# Patient Record
Sex: FEMALE | Race: WHITE | NOT HISPANIC OR LATINO | Employment: FULL TIME | URBAN - METROPOLITAN AREA
[De-identification: names, ages, dates, MRNs, and addresses within clinical notes are randomized per-mention and may not be internally consistent; named-entity substitution may affect disease eponyms.]

---

## 2019-07-19 ENCOUNTER — APPOINTMENT (OUTPATIENT)
Dept: WOMENS IMAGING | Facility: HOSPITAL | Age: 49
End: 2019-07-19

## 2019-07-19 PROCEDURE — 77067 SCR MAMMO BI INCL CAD: CPT | Performed by: RADIOLOGY

## 2019-07-19 PROCEDURE — 77063 BREAST TOMOSYNTHESIS BI: CPT | Performed by: RADIOLOGY

## 2020-07-16 ENCOUNTER — PREP FOR SURGERY (OUTPATIENT)
Dept: OTHER | Facility: HOSPITAL | Age: 50
End: 2020-07-16

## 2020-07-16 DIAGNOSIS — Z12.11 SCREENING FOR COLON CANCER: Primary | ICD-10-CM

## 2020-07-17 PROBLEM — Z12.11 SCREENING FOR COLON CANCER: Status: ACTIVE | Noted: 2020-07-17

## 2020-10-01 PROBLEM — K90.0 CELIAC DISEASE: Status: ACTIVE | Noted: 2019-12-10

## 2020-10-01 RX ORDER — OMEGA-3 FATTY ACIDS/FISH OIL 300-1000MG
600 CAPSULE ORAL
COMMUNITY
End: 2022-01-06

## 2020-11-20 ENCOUNTER — OFFICE VISIT (OUTPATIENT)
Dept: SURGERY | Facility: CLINIC | Age: 50
End: 2020-11-20

## 2020-11-20 VITALS
TEMPERATURE: 97.7 F | SYSTOLIC BLOOD PRESSURE: 120 MMHG | WEIGHT: 157.3 LBS | HEIGHT: 65 IN | OXYGEN SATURATION: 99 % | DIASTOLIC BLOOD PRESSURE: 90 MMHG | BODY MASS INDEX: 26.21 KG/M2 | HEART RATE: 76 BPM

## 2020-11-20 DIAGNOSIS — Z12.12 SCREENING FOR COLORECTAL CANCER: ICD-10-CM

## 2020-11-20 DIAGNOSIS — Z12.11 SCREENING FOR COLORECTAL CANCER: ICD-10-CM

## 2020-11-20 DIAGNOSIS — K64.4 ANAL SKIN TAG: Primary | ICD-10-CM

## 2020-11-20 PROCEDURE — 99203 OFFICE O/P NEW LOW 30 MIN: CPT | Performed by: COLON & RECTAL SURGERY

## 2020-11-20 RX ORDER — TOBRAMYCIN AND DEXAMETHASONE 3; 1 MG/ML; MG/ML
SUSPENSION/ DROPS OPHTHALMIC
COMMUNITY
Start: 2020-08-26 | End: 2020-11-20

## 2020-11-20 RX ORDER — SODIUM CHLORIDE, SODIUM LACTATE, POTASSIUM CHLORIDE, CALCIUM CHLORIDE 600; 310; 30; 20 MG/100ML; MG/100ML; MG/100ML; MG/100ML
30 INJECTION, SOLUTION INTRAVENOUS CONTINUOUS
Status: CANCELLED | OUTPATIENT
Start: 2021-02-03

## 2020-11-20 NOTE — PROGRESS NOTES
Noemi Chang is a 50 y.o. female who is seen in consultation for abnormal anal exam.      HPI:  Needs Cy for age  It was suggested that this tag could be taken off at the same time  Wanted to do Cologuard but primary said no because of unknown reason  Issue with constipation in past  Unknown family history b/c adopted  Splint for bm 50%     Tag - no bothering  After last delivery ?fissure or tear  Gyn with annual exams-have called it hem or tag    Patient is a Mohs surgery tech    abnl pap once had scrapping?   neg hpv      Past Medical History:   Diagnosis Date   • Celiac disease    • CKD (chronic kidney disease)     STAGE 1   • Kidney stones     FOLLOWED BY DR. STEPHEN BEAR   • SAB (spontaneous )      A2   • Tear of medial meniscus of left knee        Past Surgical History:   Procedure Laterality Date   •  SECTION N/A    •  SECTION N/A    • CHOLECYSTECTOMY N/A    • COLONOSCOPY N/A     NO RECORDS, PER PT WNL, (+) CELIAC DISEASE, DR. WOOD AT [place]   • ENDOSCOPY N/A    • EXTRACORPOREAL SHOCK WAVE LITHOTRIPSY (ESWL) N/A 2018    DR. STEPHEN BEAR AT [place]   • KNEE ARTHROSCOPY W/ MENISCAL REPAIR Left    • SINUS SURGERY N/A    • TONSILLECTOMY AND ADENOIDECTOMY Bilateral        Social History:   reports that she has never smoked. She has never used smokeless tobacco. She reports current alcohol use. She reports that she does not use drugs.      Marriage status:     Family History   Family history unknown: Yes         Current Outpatient Medications:   •  Ibuprofen 200 MG capsule, Take 600 mg by mouth., Disp: , Rfl:   •  progesterone (PROMETRIUM) 200 MG capsule, , Disp: , Rfl:     Allergy  Patient has no known allergies.    Review of Systems   Constitution: Negative for decreased appetite and weight gain.   HENT: Negative for congestion, hearing loss and hoarse voice.    Eyes: Negative for blurred vision, discharge and visual  disturbance.   Cardiovascular: Negative for chest pain, cyanosis and leg swelling.   Respiratory: Negative for cough, shortness of breath, sleep disturbances due to breathing and snoring.    Endocrine: Negative for cold intolerance and heat intolerance.   Hematologic/Lymphatic: Does not bruise/bleed easily.   Skin: Negative for itching, poor wound healing and skin cancer.   Musculoskeletal: Positive for joint pain. Negative for arthritis, back pain and joint swelling.   Gastrointestinal: Negative for abdominal pain, change in bowel habit, bowel incontinence and constipation.   Genitourinary: Negative for bladder incontinence, dysuria and hematuria.   Neurological: Negative for brief paralysis, excessive daytime sleepiness, dizziness, focal weakness, headaches, light-headedness and weakness.   Psychiatric/Behavioral: Negative for altered mental status and hallucinations. The patient does not have insomnia.    Allergic/Immunologic: Negative for HIV exposure and persistent infections.   All other systems reviewed and are negative.      Vitals:    11/20/20 0842   BP: 120/90   Pulse: 76   Temp: 97.7 °F (36.5 °C)   SpO2: 99%     Body mass index is 26.18 kg/m².    Physical Exam  Exam conducted with a chaperone present.   Constitutional:       General: She is not in acute distress.     Appearance: She is well-developed.   HENT:      Head: Normocephalic and atraumatic.      Nose: Nose normal.   Eyes:      Conjunctiva/sclera: Conjunctivae normal.      Pupils: Pupils are equal, round, and reactive to light.   Neck:      Musculoskeletal: Normal range of motion.      Trachea: No tracheal deviation.   Pulmonary:      Effort: Pulmonary effort is normal. No respiratory distress.      Breath sounds: Normal breath sounds.   Abdominal:      General: Bowel sounds are normal. There is no distension.      Palpations: Abdomen is soft.   Genitourinary:     Comments: Perianal exam: external hem - wnl anterior tag  YARON-good tone, no  masses    Musculoskeletal: Normal range of motion.         General: No deformity.   Skin:     General: Skin is warm and dry.   Neurological:      Mental Status: She is alert and oriented to person, place, and time.      Cranial Nerves: No cranial nerve deficit.      Coordination: Coordination normal.      Gait: Gait normal.   Psychiatric:         Behavior: Behavior normal.         Judgment: Judgment normal.         Assessment:  1. Anal skin tag    2. Screening for colorectal cancer        Plan:  Would not recommend tag removal.  Discussed with patient that it is painful and will lead to scarring which can be just as big as the tag is currently  For colon cancer screening I recommend doing colonoscopy.  We discussed risk benefits and alternatives.  Patient is amenable and will get well today

## 2020-11-20 NOTE — PROGRESS NOTES
"Cy for age  Been told have Dr. Dunaway should take  cologuard ?  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   neg hpv    bm daily  Now \"plant based diet\"  Did 30 day juice fast- 80% veg    Perianal exam: ant and post tag  YARON- good tone, no masses      "

## 2021-01-17 ENCOUNTER — IMMUNIZATION (OUTPATIENT)
Dept: VACCINE CLINIC | Facility: HOSPITAL | Age: 51
End: 2021-01-17

## 2021-01-17 PROCEDURE — 0001A: CPT | Performed by: INTERNAL MEDICINE

## 2021-01-17 PROCEDURE — 91300 HC SARSCOV02 VAC 30MCG/0.3ML IM: CPT | Performed by: INTERNAL MEDICINE

## 2021-02-12 ENCOUNTER — IMMUNIZATION (OUTPATIENT)
Dept: VACCINE CLINIC | Facility: HOSPITAL | Age: 51
End: 2021-02-12

## 2021-02-12 PROCEDURE — 0002A: CPT | Performed by: INTERNAL MEDICINE

## 2021-02-12 PROCEDURE — 91300 HC SARSCOV02 VAC 30MCG/0.3ML IM: CPT | Performed by: INTERNAL MEDICINE

## 2021-07-23 ENCOUNTER — APPOINTMENT (OUTPATIENT)
Dept: WOMENS IMAGING | Facility: HOSPITAL | Age: 51
End: 2021-07-23

## 2021-07-23 PROCEDURE — 77067 SCR MAMMO BI INCL CAD: CPT | Performed by: RADIOLOGY

## 2021-07-23 PROCEDURE — 77063 BREAST TOMOSYNTHESIS BI: CPT | Performed by: RADIOLOGY

## 2021-09-09 ENCOUNTER — LAB (OUTPATIENT)
Dept: LAB | Facility: HOSPITAL | Age: 51
End: 2021-09-09

## 2021-09-09 ENCOUNTER — TRANSCRIBE ORDERS (OUTPATIENT)
Dept: LAB | Facility: HOSPITAL | Age: 51
End: 2021-09-09

## 2021-09-09 DIAGNOSIS — Z00.00 ROUTINE GENERAL MEDICAL EXAMINATION AT A HEALTH CARE FACILITY: Primary | ICD-10-CM

## 2021-09-09 DIAGNOSIS — Z00.00 ROUTINE GENERAL MEDICAL EXAMINATION AT A HEALTH CARE FACILITY: ICD-10-CM

## 2021-09-09 PROCEDURE — U0004 COV-19 TEST NON-CDC HGH THRU: HCPCS

## 2021-09-09 PROCEDURE — C9803 HOPD COVID-19 SPEC COLLECT: HCPCS

## 2021-09-10 LAB — SARS-COV-2 RNA NOSE QL NAA+PROBE: NOT DETECTED

## 2022-01-03 ENCOUNTER — TRANSCRIBE ORDERS (OUTPATIENT)
Dept: ADMINISTRATIVE | Facility: HOSPITAL | Age: 52
End: 2022-01-03

## 2022-01-03 DIAGNOSIS — R10.11 ABDOMINAL PAIN, RIGHT UPPER QUADRANT: Primary | ICD-10-CM

## 2022-01-04 ENCOUNTER — HOSPITAL ENCOUNTER (OUTPATIENT)
Dept: CT IMAGING | Facility: HOSPITAL | Age: 52
Discharge: HOME | End: 2022-01-04
Admitting: FAMILY MEDICINE

## 2022-01-04 DIAGNOSIS — R10.11 ABDOMINAL PAIN, RIGHT UPPER QUADRANT: ICD-10-CM

## 2022-01-04 LAB — CREAT BLDA-MCNC: 0.7 MG/DL

## 2022-01-04 PROCEDURE — 0 IOPAMIDOL PER 1 ML: Performed by: FAMILY MEDICINE

## 2022-01-04 PROCEDURE — 82565 ASSAY OF CREATININE: CPT

## 2022-01-04 PROCEDURE — 74160 CT ABDOMEN W/CONTRAST: CPT

## 2022-01-04 RX ADMIN — IOPAMIDOL 100 ML: 755 INJECTION, SOLUTION INTRAVENOUS at 09:53

## 2022-01-06 ENCOUNTER — APPOINTMENT (OUTPATIENT)
Dept: GENERAL RADIOLOGY | Facility: HOSPITAL | Age: 52
End: 2022-01-06

## 2022-01-06 ENCOUNTER — OFFICE VISIT (OUTPATIENT)
Dept: GASTROENTEROLOGY | Facility: CLINIC | Age: 52
End: 2022-01-06

## 2022-01-06 ENCOUNTER — HOSPITAL ENCOUNTER (EMERGENCY)
Facility: HOSPITAL | Age: 52
Discharge: HOME | End: 2022-01-06
Attending: EMERGENCY MEDICINE | Admitting: EMERGENCY MEDICINE

## 2022-01-06 VITALS — DIASTOLIC BLOOD PRESSURE: 72 MMHG | BODY MASS INDEX: 26.18 KG/M2 | HEIGHT: 65 IN | SYSTOLIC BLOOD PRESSURE: 130 MMHG

## 2022-01-06 VITALS
HEIGHT: 65 IN | RESPIRATION RATE: 16 BRPM | HEART RATE: 84 BPM | WEIGHT: 181.22 LBS | DIASTOLIC BLOOD PRESSURE: 85 MMHG | BODY MASS INDEX: 30.19 KG/M2 | OXYGEN SATURATION: 98 % | SYSTOLIC BLOOD PRESSURE: 153 MMHG | TEMPERATURE: 98 F

## 2022-01-06 DIAGNOSIS — M25.422 EFFUSION OF LEFT ELBOW: ICD-10-CM

## 2022-01-06 DIAGNOSIS — K90.0 CELIAC DISEASE: Chronic | ICD-10-CM

## 2022-01-06 DIAGNOSIS — S52.125A CLOSED NONDISPLACED FRACTURE OF HEAD OF LEFT RADIUS, INITIAL ENCOUNTER: Primary | ICD-10-CM

## 2022-01-06 DIAGNOSIS — R93.2 ABNORMAL CT OF LIVER: ICD-10-CM

## 2022-01-06 DIAGNOSIS — K76.0 HEPATIC STEATOSIS: ICD-10-CM

## 2022-01-06 DIAGNOSIS — Z12.11 COLON CANCER SCREENING: ICD-10-CM

## 2022-01-06 DIAGNOSIS — R10.11 RUQ PAIN: Primary | ICD-10-CM

## 2022-01-06 PROCEDURE — 73080 X-RAY EXAM OF ELBOW: CPT

## 2022-01-06 PROCEDURE — 63710000001 ONDANSETRON ODT 4 MG TABLET DISPERSIBLE: Performed by: EMERGENCY MEDICINE

## 2022-01-06 PROCEDURE — 99204 OFFICE O/P NEW MOD 45 MIN: CPT | Performed by: INTERNAL MEDICINE

## 2022-01-06 PROCEDURE — 99284 EMERGENCY DEPT VISIT MOD MDM: CPT

## 2022-01-06 RX ORDER — SODIUM CHLORIDE, SODIUM LACTATE, POTASSIUM CHLORIDE, CALCIUM CHLORIDE 600; 310; 30; 20 MG/100ML; MG/100ML; MG/100ML; MG/100ML
30 INJECTION, SOLUTION INTRAVENOUS CONTINUOUS
Status: CANCELLED | OUTPATIENT
Start: 2022-02-11

## 2022-01-06 RX ORDER — SERTRALINE HYDROCHLORIDE 100 MG/1
100 TABLET, FILM COATED ORAL NIGHTLY
COMMUNITY
Start: 2021-12-28 | End: 2023-03-13

## 2022-01-06 RX ORDER — HYDROCODONE BITARTRATE AND ACETAMINOPHEN 5; 325 MG/1; MG/1
1 TABLET ORAL EVERY 6 HOURS PRN
Qty: 12 TABLET | Refills: 0 | OUTPATIENT
Start: 2022-01-06 | End: 2022-06-29

## 2022-01-06 RX ORDER — HYDROXYZINE PAMOATE 50 MG/1
CAPSULE ORAL
COMMUNITY
Start: 2021-12-15 | End: 2023-03-13

## 2022-01-06 RX ORDER — ONDANSETRON 4 MG/1
4 TABLET, ORALLY DISINTEGRATING ORAL EVERY 6 HOURS PRN
Qty: 20 TABLET | Refills: 0 | OUTPATIENT
Start: 2022-01-06 | End: 2022-06-29

## 2022-01-06 RX ORDER — ONDANSETRON 4 MG/1
4 TABLET, ORALLY DISINTEGRATING ORAL ONCE
Status: COMPLETED | OUTPATIENT
Start: 2022-01-06 | End: 2022-01-06

## 2022-01-06 RX ORDER — HYDROCODONE BITARTRATE AND ACETAMINOPHEN 5; 325 MG/1; MG/1
1 TABLET ORAL ONCE
Status: COMPLETED | OUTPATIENT
Start: 2022-01-06 | End: 2022-01-06

## 2022-01-06 RX ADMIN — ONDANSETRON 4 MG: 4 TABLET, ORALLY DISINTEGRATING ORAL at 18:12

## 2022-01-06 RX ADMIN — HYDROCODONE BITARTRATE AND ACETAMINOPHEN 1 TABLET: 5; 325 TABLET ORAL at 18:12

## 2022-01-06 NOTE — ED PROVIDER NOTES
Subjective   Pt slipped and fell on ice while getting out of car. Struck elbow on the car as she fell, now complaining of severe pain in elbow with movement.      History provided by:  Patient   used: No        Review of Systems   Constitutional: Negative.    HENT: Negative.    Eyes: Negative.    Respiratory: Negative.    Cardiovascular: Negative.    Gastrointestinal: Negative.    Endocrine: Negative.    Genitourinary: Negative.    Musculoskeletal: Negative.    Skin: Negative.    Allergic/Immunologic: Negative.    Neurological: Negative.    Hematological: Negative.    Psychiatric/Behavioral: Negative.        Past Medical History:   Diagnosis Date   • Celiac disease    • CKD (chronic kidney disease)     STAGE 1   • Kidney stones     FOLLOWED BY DR. STEPHEN BEAR   • SAB (spontaneous )      A2   • Tear of medial meniscus of left knee        No Known Allergies    Past Surgical History:   Procedure Laterality Date   •  SECTION N/A    •  SECTION N/A    • CHOLECYSTECTOMY N/A    • COLONOSCOPY N/A     NO RECORDS, PER PT WNL, (+) CELIAC DISEASE, DR. WOOD AT [place]   • ENDOSCOPY N/A    • EXTRACORPOREAL SHOCK WAVE LITHOTRIPSY (ESWL) N/A 2018    DR. STEPHEN BEAR AT [place]   • KNEE ARTHROSCOPY W/ MENISCAL REPAIR Left    • SINUS SURGERY N/A    • TONSILLECTOMY AND ADENOIDECTOMY Bilateral        Family History   Family history unknown: Yes       Social History     Socioeconomic History   • Marital status:    Tobacco Use   • Smoking status: Never Smoker   • Smokeless tobacco: Never Used   Substance and Sexual Activity   • Alcohol use: Yes     Comment: RARELY   • Drug use: Never   • Sexual activity: Not Currently     Comment: Single.           Objective   Physical Exam  Vitals and nursing note reviewed.   Constitutional:       Appearance: Normal appearance.   HENT:      Head: Normocephalic and atraumatic.      Nose: Nose normal.       Mouth/Throat:      Mouth: Mucous membranes are moist.   Eyes:      Pupils: Pupils are equal, round, and reactive to light.   Cardiovascular:      Rate and Rhythm: Normal rate and regular rhythm.      Pulses: Normal pulses.   Pulmonary:      Effort: Pulmonary effort is normal.      Breath sounds: Normal breath sounds.   Abdominal:      General: Abdomen is flat. Bowel sounds are normal.      Palpations: Abdomen is soft.   Musculoskeletal:      Right elbow: Normal.      Left elbow: Decreased range of motion. Tenderness present in radial head.        Arms:       Cervical back: Normal range of motion.      Comments: Sensation intact, distal pulses present, CRT <2sec   Skin:     General: Skin is warm and dry.      Capillary Refill: Capillary refill takes less than 2 seconds.   Neurological:      General: No focal deficit present.      Mental Status: She is alert and oriented to person, place, and time. Mental status is at baseline.   Psychiatric:         Mood and Affect: Mood normal.         FX Dislocation    Date/Time: 1/6/2022 6:37 PM  Performed by: Karuna Bah APRN  Authorized by: Jamie Hess MD     Consent:     Consent obtained:  Verbal    Consent given by:  Patient    Risks discussed:  Pain  Injury:     Injury location:  Elbow    Elbow injury location:  L elbow  Pre-procedure assessment:     Neurological function: normal      Distal perfusion: normal      Range of motion: reduced    Anesthesia (see MAR for exact dosages):     Anesthesia method:  None  Procedure details:     Manipulation performed: no      Skin traction used: no      Skeletal traction used: no      Immobilization:  Splint    Splint type:  Long arm    Supplies used:  Ortho-Glass, elastic bandage and cotton padding  Post-procedure details:     Neurological function: normal      Distal perfusion: normal      Range of motion: unchanged      Patient tolerance of procedure:  Tolerated well, no immediate complications      MDM  Number of Diagnoses  or Management Options  Closed nondisplaced fracture of head of left radius, initial encounter: new and requires workup  Effusion of left elbow: new and requires workup     Amount and/or Complexity of Data Reviewed  Tests in the radiology section of CPT®: reviewed and ordered    Risk of Complications, Morbidity, and/or Mortality  Presenting problems: low  Diagnostic procedures: low  Management options: low    Patient Progress  Patient progress: stable      Final diagnoses:   Closed nondisplaced fracture of head of left radius, initial encounter   Effusion of left elbow       ED Disposition  ED Disposition     ED Disposition Condition Comment    Discharge Stable           Been, Richmond SANTOS MD  [address] [place] [address]  [place] [address] [phone]    Call   for a follow up appointment in the next week or two         Medication List      New Prescriptions    HYDROcodone-acetaminophen 5-325 MG per tablet  Commonly known as: NORCO  Take 1 tablet by mouth Every 6 (Six) Hours As Needed for Moderate Pain  or Severe Pain .     ondansetron ODT 4 MG disintegrating tablet  Commonly known as: ZOFRAN-ODT  Place 1 tablet on the tongue Every 6 (Six) Hours As Needed for Nausea or Vomiting.           Where to Get Your Medications      These medications were sent to [id number] - [place], [place] - [address] AT [facility] - [phone]  - [phone] FX  [address] [place] [address]    Phone: [phone]   · HYDROcodone-acetaminophen 5-325 MG per tablet  · ondansetron ODT 4 MG disintegrating tablet          Karuna Bah, APRN  01/06/22 7961

## 2022-01-06 NOTE — DISCHARGE INSTRUCTIONS
Elevate, apply ice for 20mins on and 20mins off for the next several days. Keep the splint clean and dry, wear the sling while up and about but at home come out of the sling and put your arm up on a pillow as much as possible. Take the norco as needed for pain and the zofran as needed for nausea. Follow up with Dr Burciaga in the next week or two, return to the ED for worsening or new symptoms of concern.

## 2022-01-06 NOTE — PROGRESS NOTES
"Chief Complaint   Patient presents with   • Abdominal Pain   • Hepatic Disease       Subjective     HPI    Noemi Chang is a 51 y.o. female with a past medical history noted below who presents for abdominal pain and abnormal liver imaging.    She saw urgent care earlier this week for episodes of RUQ pain. Feels full and bloated, feels that something \"catches\" when she leans forward.  Wraps around her back.  Had a CT scan and was told to follow up with GI, evidence of steatosis, altered enhancement of the hepatic lobe for which MRI was recommended.    She has had about 20# weight gain over the past year.    Reports hx of celiac disease diagnosed 2007.  She has been on a gluten free diet.  No recent celiac serologies.  Last gastroenterologist was in [place].  She does not believe she is ever had celiac serologies tested.  She has not had any recent labs.    Family hx unknown, she is adopted.    Hx of shonda, C -section    No smoking, no excess ETOH.      Overdue for colonoscopy for screening.    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    Today's visit was in the office.  Both the patient and I were wearing face masks and proper hand hygiene was performed before and after the physical exam.           Current Outpatient Medications:   •  hydrOXYzine pamoate (VISTARIL) 50 MG capsule, TAKE 1 CAPSULE BY MOUTH TWICE DAILY AS NEEDED FOR PANIC OR SLEEP, Disp: , Rfl:   •  progesterone (PROMETRIUM) 200 MG capsule, , Disp: , Rfl:   •  sertraline (ZOLOFT) 100 MG tablet, TAKE 1 TABLET BY MOUTH EACH DAY, Disp: , Rfl:       Objective     Vitals:    01/06/22 0948   BP: 130/72     There were no vitals filed for this visit.  Body mass index is 26.18 kg/m².    Physical Exam  Constitutional:       General: She is not in acute distress.  Pulmonary:      Effort: Pulmonary effort is normal.   Neurological:      Mental Status: She is alert and oriented to person, place, and time.   Psychiatric:         Mood and Affect: Mood " normal.         Behavior: Behavior normal.         Thought Content: Thought content normal.         Judgment: Judgment normal.             WBC   Date Value Ref Range Status   01/25/2019 4.72 4.5 - 11.0 10*3/uL Final     RBC   Date Value Ref Range Status   01/25/2019 4.26 4.0 - 5.2 10*6/uL Final     Hemoglobin   Date Value Ref Range Status   01/25/2019 13.3 12.0 - 16.0 g/dL Final     Hematocrit   Date Value Ref Range Status   01/25/2019 38.1 36.0 - 46.0 % Final     MCV   Date Value Ref Range Status   01/25/2019 89.4 80.0 - 100.0 fL Final     MCH   Date Value Ref Range Status   01/25/2019 31.2 26.0 - 34.0 pg Final     MCHC   Date Value Ref Range Status   01/25/2019 34.9 31.0 - 37.0 g/dL Final     RDW   Date Value Ref Range Status   01/25/2019 12.0 12.0 - 16.8 % Final     MPV   Date Value Ref Range Status   01/25/2019 10.1 6.7 - 10.8 fL Final     Platelets   Date Value Ref Range Status   01/25/2019 209 140 - 440 10*3/uL Final     Neutrophil Rel %   Date Value Ref Range Status   01/25/2019 49.2 45 - 80 % Final     Lymphocyte Rel %   Date Value Ref Range Status   01/25/2019 37.7 15 - 50 % Final     Monocyte Rel %   Date Value Ref Range Status   01/25/2019 8.9 0 - 15 % Final     Eosinophil %   Date Value Ref Range Status   01/25/2019 3.8 0 - 7 % Final     Basophil Rel %   Date Value Ref Range Status   01/25/2019 0.2 0 - 2 % Final     Immature Grans %   Date Value Ref Range Status   01/25/2019 0.2 (H) 0 % Final     Neutrophils Absolute   Date Value Ref Range Status   01/25/2019 2.32 2.0 - 8.8 10*3/uL Final     Lymphocytes Absolute   Date Value Ref Range Status   01/25/2019 1.78 0.7 - 5.5 10*3/uL Final     Monocytes Absolute   Date Value Ref Range Status   01/25/2019 0.42 0.0 - 1.7 10*3/uL Final     Eosinophils Absolute   Date Value Ref Range Status   01/25/2019 0.18 0.0 - 0.8 10*3/uL Final     Basophils Absolute   Date Value Ref Range Status   01/25/2019 0.01 0.0 - 0.2 10*3/uL Final     Immature Grans, Absolute   Date  Value Ref Range Status   01/25/2019 0.01 <1 10*3/uL Final     nRBC   Date Value Ref Range Status   01/25/2019 0 0 /100(WBC) Final       Lab Results   Component Value Date    BUN 11 01/09/2020    CREATININE 0.70 01/04/2022    BCR 19.0 01/09/2020    CO2 26 01/09/2020    CALCIUM 9.4 01/09/2020    ALBUMIN 4.3 01/09/2020    LABIL2 1.7 01/09/2020    AST 19 01/09/2020    ALT 21 01/09/2020       PROCEDURE:  CT ABDOMEN W CONTRAST     COMPARISON: None     INDICATIONS:  RUQ PAIN POST CHOLSYSTECTOMY. nausea     TECHNIQUE:    After obtaining the patient's consent, CT images were created with non-ionic intravenous   contrast material.       PROTOCOL:     Standard imaging protocol performed                 RADIATION:      DLP: 362mGy*cm               Automated exposure control was utilized to minimize radiation dose.   CONTRAST:      100cc Isovue 370 I.V.     FINDINGS:          Abdomen:  Included lung bases are clear.     Liver measures 17 cm.  Hepatic steatosis.  There is a geographic area of enhancement seen in the   left hepatic lobe measuring approximately 5.2 x 2.4 cm.     Spleen, adrenal glands, pancreas are unremarkable.  Previous cholecystectomy.  Included bowel loops   are nondilated.  Moderate colonic stool burden.  Included portions of the appendix are normal.  No   aggressive appearing bone change.       CONCLUSION: Hepatic steatosis.     Geographic area of altered enhancement in the left hepatic lobe favored to be perfusion all.    Consider an MRI for further characterization.     Constipation            ONEIDA GREGORY MD         Electronically Signed and Approved By: ONEIDA GREGORY MD on 1/04/2022 at 10:21       I personally reviewed data as detailed below:     The labs listed above.    The radiology studies listed above.    Office notes from: 1/3/22 pcp, 2020 Dr. Caro          No notes on file    Assessment/Plan    1.  Right upper quadrant pain: I suspect this is musculoskeletal by description as it wraps around to her  back and really other than hepatic steatosis no significant findings on her CT scan    2.  Abnormal CT imaging: Hepatic steatosis, abnormal enhancement of the liver for which MRI is recommended    3.  History of celiac disease: No recent surveillance, she is on a gluten-free diet  1 4.  Colon cancer screening: Average risk    Plan  Celiac serologies today along with CBC, CMP  MRI for further evaluation of the CT findings  EGD given chronic history of celiac disease, right upper quadrant pain  Colonoscopy for screening purposes at same time as above    Diagnoses and all orders for this visit:    1. RUQ pain (Primary)  -     Gliadin Antibody, IgG  -     IgA  -     Tissue Transglutaminase, IgA  -     Tissue Transglutaminase, IgG  -     Comprehensive Metabolic Panel  -     CBC & Differential  -     MRI Abdomen With Contrast; Future  -     Case Request; Standing  -     COVID PRE-OP / PRE-PROCEDURE SCREENING ORDER (NO ISOLATION) - Swab, Nasopharynx; Future  -     Follow Anesthesia Guidelines / Standing Orders; Future  -     Obtain Informed Consent; Future  -     Case Request    2. Abnormal CT of liver  -     Gliadin Antibody, IgG  -     IgA  -     Tissue Transglutaminase, IgA  -     Tissue Transglutaminase, IgG  -     Comprehensive Metabolic Panel  -     CBC & Differential  -     MRI Abdomen With Contrast; Future    3. Celiac disease  -     Gliadin Antibody, IgG  -     IgA  -     Tissue Transglutaminase, IgA  -     Tissue Transglutaminase, IgG  -     Comprehensive Metabolic Panel  -     CBC & Differential  -     MRI Abdomen With Contrast; Future  -     Case Request; Standing  -     COVID PRE-OP / PRE-PROCEDURE SCREENING ORDER (NO ISOLATION) - Swab, Nasopharynx; Future  -     Follow Anesthesia Guidelines / Standing Orders; Future  -     Obtain Informed Consent; Future  -     Case Request    4. Hepatic steatosis    5. Colon cancer screening  -     Case Request; Standing  -     COVID PRE-OP / PRE-PROCEDURE SCREENING ORDER  (NO ISOLATION) - Swab, Nasopharynx; Future  -     Follow Anesthesia Guidelines / Standing Orders; Future  -     Obtain Informed Consent; Future  -     Case Request        I have discussed the above plan with the patient.  They verbalize understanding and are in agreement with the plan.  They have been advised to contact the office for any questions, concerns, or changes related to their health.    Dictated utilizing Dragon dictation

## 2022-01-06 NOTE — ED PROVIDER NOTES
"Patient is 51 y.o. year old female that presents to the ED for evaluation of left elbow injury and swelling from fall today.     ED Course:    /85 (BP Location: Right arm, Patient Position: Sitting)   Pulse 84   Temp 98 °F (36.7 °C) (Oral)   Resp 16   Ht 165.1 cm (65\")   Wt 82.2 kg (181 lb 3.5 oz)   SpO2 98%   BMI 30.16 kg/m²   Results for orders placed or performed during the hospital encounter of 01/04/22   POC Creatinine    Specimen: Blood   Result Value Ref Range    Creatinine 0.70 mg/dL    GFR MDRD       GFR MDRD Non African American 88 mL/min/1.73 sq.M     Medications   HYDROcodone-acetaminophen (NORCO) 5-325 MG per tablet 1 tablet (1 tablet Oral Given 1/6/22 1812)   ondansetron ODT (ZOFRAN-ODT) disintegrating tablet 4 mg (4 mg Oral Given 1/6/22 1812)     XR Elbow 3+ View Left    Result Date: 1/6/2022  Narrative: PROCEDURE: XR ELBOW 3+ VW LEFT  COMPARISON: None  INDICATIONS: FALL RESULTING IN PAIN TO LEFT ELBOW  FINDINGS:  There is a moderate-sized hemarthrosis.  On the lateral view there is a small triangular bony fragment which projects over the anterior aspect of the radial head near the level of the coronoid process.  Findings would be consistent with a small fracture fragment given the joint effusion.  Discrete fracture lines are not visualized on any of the other images.  Distal humerus appears intact.  Soft tissues are unremarkable.      Impression:   1. 1. Moderate size joint effusion/hemarthrosis.  2.  Small bony fragment seen on the lateral view only projecting over the radial head near the coronoid process of the ulna suspicious for fracture.      JESUS LIU MD       Electronically Signed and Approved By: JESUS LIU MD on 1/06/2022 at 18:12             CT Abdomen With Contrast    Result Date: 1/4/2022  Narrative: PROCEDURE: CT ABDOMEN W CONTRAST  COMPARISON: None  INDICATIONS: RUQ PAIN POST CHOLSYSTECTOMY. nausea  TECHNIQUE: After obtaining the patient's consent, " CT images were created with non-ionic intravenous contrast material.   PROTOCOL:   Standard imaging protocol performed    RADIATION:   DLP: 362mGy*cm   Automated exposure control was utilized to minimize radiation dose. CONTRAST: 100cc Isovue 370 I.V.  FINDINGS:  Abdomen:  Included lung bases are clear.  Liver measures 17 cm.  Hepatic steatosis.  There is a geographic area of enhancement seen in the left hepatic lobe measuring approximately 5.2 x 2.4 cm.  Spleen, adrenal glands, pancreas are unremarkable.  Previous cholecystectomy.  Included bowel loops are nondilated.  Moderate colonic stool burden.  Included portions of the appendix are normal.  No aggressive appearing bone change.   CONCLUSION: Hepatic steatosis.  Geographic area of altered enhancement in the left hepatic lobe favored to be perfusion all.  Consider an MRI for further characterization.  Constipation     ONEIDA GREGORY MD       Electronically Signed and Approved By: ONEIDA GREGORY MD on 1/04/2022 at 10:21               MDM:    Patient was placed in a splint and sling for radial head fracture and elbow effusion of the left elbow and will follow-up with orthopedics and with given prescription pain meds for pain control.      The case was discussed between the WILBERT and myself as part of a shared ED visit.     Patient  care including, but not limited to ordered imaging, medications, and lab results were reviewed. I then performed the substantive portion of the visit including all aspects of the medical decision making.        Jamie Hess MD  18:34 EST  01/06/22       Jamie Hess MD  01/06/22 3413

## 2022-01-07 LAB
ALBUMIN SERPL-MCNC: 4.4 G/DL (ref 3.8–4.9)
ALBUMIN/GLOB SERPL: 1.8 {RATIO} (ref 1.2–2.2)
ALP SERPL-CCNC: 73 IU/L (ref 44–121)
ALT SERPL-CCNC: 31 IU/L (ref 0–32)
AST SERPL-CCNC: 20 IU/L (ref 0–40)
BASOPHILS # BLD AUTO: 0 X10E3/UL (ref 0–0.2)
BASOPHILS NFR BLD AUTO: 1 %
BILIRUB SERPL-MCNC: 0.2 MG/DL (ref 0–1.2)
BUN SERPL-MCNC: 15 MG/DL (ref 6–24)
BUN/CREAT SERPL: 22 (ref 9–23)
CALCIUM SERPL-MCNC: 9.3 MG/DL (ref 8.7–10.2)
CHLORIDE SERPL-SCNC: 104 MMOL/L (ref 96–106)
CO2 SERPL-SCNC: 23 MMOL/L (ref 20–29)
CREAT SERPL-MCNC: 0.69 MG/DL (ref 0.57–1)
EOSINOPHIL # BLD AUTO: 0.1 X10E3/UL (ref 0–0.4)
EOSINOPHIL NFR BLD AUTO: 3 %
ERYTHROCYTE [DISTWIDTH] IN BLOOD BY AUTOMATED COUNT: 12.1 % (ref 11.7–15.4)
GLIADIN PEPTIDE IGG SER-ACNC: 2 UNITS (ref 0–19)
GLOBULIN SER CALC-MCNC: 2.4 G/DL (ref 1.5–4.5)
GLUCOSE SERPL-MCNC: 108 MG/DL (ref 65–99)
HCT VFR BLD AUTO: 42.4 % (ref 34–46.6)
HGB BLD-MCNC: 14.2 G/DL (ref 11.1–15.9)
IGA SERPL-MCNC: 67 MG/DL (ref 87–352)
IMM GRANULOCYTES # BLD AUTO: 0 X10E3/UL (ref 0–0.1)
IMM GRANULOCYTES NFR BLD AUTO: 1 %
LYMPHOCYTES # BLD AUTO: 1.7 X10E3/UL (ref 0.7–3.1)
LYMPHOCYTES NFR BLD AUTO: 29 %
MCH RBC QN AUTO: 30.3 PG (ref 26.6–33)
MCHC RBC AUTO-ENTMCNC: 33.5 G/DL (ref 31.5–35.7)
MCV RBC AUTO: 91 FL (ref 79–97)
MONOCYTES # BLD AUTO: 0.4 X10E3/UL (ref 0.1–0.9)
MONOCYTES NFR BLD AUTO: 7 %
NEUTROPHILS # BLD AUTO: 3.4 X10E3/UL (ref 1.4–7)
NEUTROPHILS NFR BLD AUTO: 59 %
PLATELET # BLD AUTO: 227 X10E3/UL (ref 150–450)
POTASSIUM SERPL-SCNC: 4.6 MMOL/L (ref 3.5–5.2)
PROT SERPL-MCNC: 6.8 G/DL (ref 6–8.5)
RBC # BLD AUTO: 4.68 X10E6/UL (ref 3.77–5.28)
SODIUM SERPL-SCNC: 140 MMOL/L (ref 134–144)
TTG IGA SER-ACNC: <2 U/ML (ref 0–3)
TTG IGG SER-ACNC: 5 U/ML (ref 0–5)
WBC # BLD AUTO: 5.7 X10E3/UL (ref 3.4–10.8)

## 2022-01-08 DIAGNOSIS — R76.8 LOW SERUM IGA FOR AGE: Primary | ICD-10-CM

## 2022-01-08 NOTE — PROGRESS NOTES
Her celiac serologies are negative.  This indicates that she is very adherent to gluten-free diet.  Her IgA level is low so I am going to refer her to allergy/immunology for further evaluation of this.  Her CMP and CBC are in normal range.    Please remind her to schedule her scopes, thank you.

## 2022-01-10 ENCOUNTER — TELEPHONE (OUTPATIENT)
Dept: GASTROENTEROLOGY | Facility: CLINIC | Age: 52
End: 2022-01-10

## 2022-01-10 NOTE — TELEPHONE ENCOUNTER
----- Message from Ana Muhammad MD sent at 1/8/2022  2:34 PM EST -----  Her celiac serologies are negative.  This indicates that she is very adherent to gluten-free diet.  Her IgA level is low so I am going to refer her to allergy/immunology for further evaluation of this.  Her CMP and CBC are in normal range.    Please remind her to schedule her scopes, thank you.

## 2022-01-12 ENCOUNTER — TELEPHONE (OUTPATIENT)
Dept: ORTHOPEDIC SURGERY | Facility: CLINIC | Age: 52
End: 2022-01-12

## 2022-01-12 NOTE — TELEPHONE ENCOUNTER
NEW PATIENT- INCOMING SELF REFERRAL FOR Closed nondisplaced fracture of head of left radius, initial encounter [S52.125A]. PATIENT WENT TO  [facility] ON 1/6/22.

## 2022-01-14 ENCOUNTER — TELEPHONE (OUTPATIENT)
Dept: GASTROENTEROLOGY | Facility: CLINIC | Age: 52
End: 2022-01-14

## 2022-01-14 NOTE — TELEPHONE ENCOUNTER
[facility] for 03/11 arrive at [id number] mailing out prep inst on 1/17, advised pt time is subject to change [facility] will call.

## 2022-01-20 ENCOUNTER — HOSPITAL ENCOUNTER (OUTPATIENT)
Dept: MRI IMAGING | Facility: HOSPITAL | Age: 52
Discharge: HOME | End: 2022-01-20
Admitting: INTERNAL MEDICINE

## 2022-01-20 DIAGNOSIS — R93.2 ABNORMAL CT OF LIVER: ICD-10-CM

## 2022-01-20 DIAGNOSIS — R10.11 RUQ PAIN: ICD-10-CM

## 2022-01-20 DIAGNOSIS — K90.0 CELIAC DISEASE: Chronic | ICD-10-CM

## 2022-01-20 PROCEDURE — 0 GADOBENATE DIMEGLUMINE 529 MG/ML SOLUTION: Performed by: INTERNAL MEDICINE

## 2022-01-20 PROCEDURE — 74182 MRI ABDOMEN W/CONTRAST: CPT

## 2022-01-20 PROCEDURE — A9577 INJ MULTIHANCE: HCPCS | Performed by: INTERNAL MEDICINE

## 2022-01-20 RX ADMIN — GADOBENATE DIMEGLUMINE 16 ML: 529 INJECTION, SOLUTION INTRAVENOUS at 09:46

## 2022-01-20 NOTE — TELEPHONE ENCOUNTER
spoke with pt rsw from 3-11 to feb 11 arrive at 1100 am dionnaw bong julio to change her existing paperwork---rsw due to md out

## 2022-01-21 ENCOUNTER — TELEPHONE (OUTPATIENT)
Dept: GASTROENTEROLOGY | Facility: CLINIC | Age: 52
End: 2022-01-21

## 2022-01-21 NOTE — TELEPHONE ENCOUNTER
----- Message from Raya Ibarra sent at 1/21/2022 10:14 AM EST -----  Regarding: mri results  Contact: [phone]  Pt is calling for her mri results.

## 2022-01-21 NOTE — TELEPHONE ENCOUNTER
"Her MRI shows diffuse fatty liver.  The area seen on the CT scan is suggestive of a benign area of fatty sparing but note is made of some greater \"enhancement\" in this area (though no concerning findings around this area) and the radiologist recommends a repeat MRI in 6 months.  I will arrange to repeat MRI following her May follow-up appointment.                     "

## 2022-01-21 NOTE — PROGRESS NOTES
"Her MRI shows diffuse fatty liver.  The area seen on the CT scan is suggestive of a benign area of fatty sparing but note is made of some greater \"enhancement\" in this area (though no concerning findings around this area) and the radiologist recommends a repeat MRI in 6 months.  I will arrange to repeat MRI following her May follow-up appointment"

## 2022-02-21 ENCOUNTER — TELEPHONE (OUTPATIENT)
Dept: GASTROENTEROLOGY | Facility: CLINIC | Age: 52
End: 2022-02-21

## 2022-02-21 NOTE — TELEPHONE ENCOUNTER
Made appointment with allergy on march 23 at [address] phone [phone] lm on pt vm with appt d/t/l and phone number

## 2022-06-16 ENCOUNTER — TELEPHONE (OUTPATIENT)
Dept: NEUROLOGY | Facility: CLINIC | Age: 52
End: 2022-06-16

## 2022-06-16 NOTE — TELEPHONE ENCOUNTER
PT WENT TWINS [facility] IN [place] 6-11-22 AND THEY TOLD HER TO SEE NEUROLOGIST FOR BELL'S PALSY. PT DOES NOT HAVE A PCP, SO SHE GOING TO SEE IF THEY WILL REFER HER TO  [place]URO [place]. SHE WILL CALL BACK AND LET US KNOW.

## 2022-06-29 ENCOUNTER — APPOINTMENT (OUTPATIENT)
Dept: CT IMAGING | Facility: HOSPITAL | Age: 52
End: 2022-06-29

## 2022-06-29 ENCOUNTER — HOSPITAL ENCOUNTER (EMERGENCY)
Facility: HOSPITAL | Age: 52
Discharge: HOME | End: 2022-06-29
Attending: EMERGENCY MEDICINE | Admitting: EMERGENCY MEDICINE

## 2022-06-29 VITALS
RESPIRATION RATE: 16 BRPM | WEIGHT: 179.01 LBS | DIASTOLIC BLOOD PRESSURE: 67 MMHG | HEART RATE: 73 BPM | SYSTOLIC BLOOD PRESSURE: 112 MMHG | TEMPERATURE: 98.1 F | OXYGEN SATURATION: 100 % | HEIGHT: 65 IN | BODY MASS INDEX: 29.83 KG/M2

## 2022-06-29 DIAGNOSIS — N23 RENAL COLIC ON RIGHT SIDE: Primary | ICD-10-CM

## 2022-06-29 DIAGNOSIS — N20.1 URETEROLITHIASIS: ICD-10-CM

## 2022-06-29 LAB
ALBUMIN SERPL-MCNC: 4.4 G/DL (ref 3.5–5.2)
ALBUMIN/GLOB SERPL: 1.7 G/DL
ALP SERPL-CCNC: 96 U/L (ref 39–117)
ALT SERPL W P-5'-P-CCNC: 22 U/L (ref 1–33)
ANION GAP SERPL CALCULATED.3IONS-SCNC: 12.8 MMOL/L (ref 5–15)
AST SERPL-CCNC: 15 U/L (ref 1–32)
BACTERIA UR QL AUTO: ABNORMAL /HPF
BASOPHILS # BLD AUTO: 0.03 10*3/MM3 (ref 0–0.2)
BASOPHILS NFR BLD AUTO: 0.5 % (ref 0–1.5)
BILIRUB SERPL-MCNC: 0.3 MG/DL (ref 0–1.2)
BILIRUB UR QL STRIP: ABNORMAL
BUN SERPL-MCNC: 15 MG/DL (ref 6–20)
BUN/CREAT SERPL: 19.7 (ref 7–25)
CALCIUM SPEC-SCNC: 9.6 MG/DL (ref 8.6–10.5)
CHLORIDE SERPL-SCNC: 103 MMOL/L (ref 98–107)
CLARITY UR: ABNORMAL
CO2 SERPL-SCNC: 21.2 MMOL/L (ref 22–29)
COLOR UR: ABNORMAL
CREAT SERPL-MCNC: 0.76 MG/DL (ref 0.57–1)
DEPRECATED RDW RBC AUTO: 41.2 FL (ref 37–54)
EGFRCR SERPLBLD CKD-EPI 2021: 93.8 ML/MIN/1.73
EOSINOPHIL # BLD AUTO: 0.16 10*3/MM3 (ref 0–0.4)
EOSINOPHIL NFR BLD AUTO: 2.5 % (ref 0.3–6.2)
ERYTHROCYTE [DISTWIDTH] IN BLOOD BY AUTOMATED COUNT: 13 % (ref 12.3–15.4)
GLOBULIN UR ELPH-MCNC: 2.6 GM/DL
GLUCOSE SERPL-MCNC: 207 MG/DL (ref 65–99)
GLUCOSE UR STRIP-MCNC: NEGATIVE MG/DL
HCT VFR BLD AUTO: 42 % (ref 34–46.6)
HGB BLD-MCNC: 14.9 G/DL (ref 12–15.9)
HGB UR QL STRIP.AUTO: ABNORMAL
HOLD SPECIMEN: NORMAL
HOLD SPECIMEN: NORMAL
HYALINE CASTS UR QL AUTO: ABNORMAL /LPF
IMM GRANULOCYTES # BLD AUTO: 0.04 10*3/MM3 (ref 0–0.05)
IMM GRANULOCYTES NFR BLD AUTO: 0.6 % (ref 0–0.5)
KETONES UR QL STRIP: NEGATIVE
LEUKOCYTE ESTERASE UR QL STRIP.AUTO: ABNORMAL
LIPASE SERPL-CCNC: 42 U/L (ref 13–60)
LYMPHOCYTES # BLD AUTO: 1.57 10*3/MM3 (ref 0.7–3.1)
LYMPHOCYTES NFR BLD AUTO: 24.5 % (ref 19.6–45.3)
MCH RBC QN AUTO: 31.2 PG (ref 26.6–33)
MCHC RBC AUTO-ENTMCNC: 35.5 G/DL (ref 31.5–35.7)
MCV RBC AUTO: 88.1 FL (ref 79–97)
MONOCYTES # BLD AUTO: 0.48 10*3/MM3 (ref 0.1–0.9)
MONOCYTES NFR BLD AUTO: 7.5 % (ref 5–12)
NEUTROPHILS NFR BLD AUTO: 4.12 10*3/MM3 (ref 1.7–7)
NEUTROPHILS NFR BLD AUTO: 64.4 % (ref 42.7–76)
NITRITE UR QL STRIP: NEGATIVE
NRBC BLD AUTO-RTO: 0 /100 WBC (ref 0–0.2)
PH UR STRIP.AUTO: <=5 [PH] (ref 5–8)
PLATELET # BLD AUTO: 194 10*3/MM3 (ref 140–450)
PMV BLD AUTO: 9.9 FL (ref 6–12)
POTASSIUM SERPL-SCNC: 3.8 MMOL/L (ref 3.5–5.2)
PROT SERPL-MCNC: 7 G/DL (ref 6–8.5)
PROT UR QL STRIP: ABNORMAL
RBC # BLD AUTO: 4.77 10*6/MM3 (ref 3.77–5.28)
RBC # UR STRIP: ABNORMAL /HPF
REF LAB TEST METHOD: ABNORMAL
SODIUM SERPL-SCNC: 137 MMOL/L (ref 136–145)
SP GR UR STRIP: 1.03 (ref 1–1.03)
SQUAMOUS #/AREA URNS HPF: ABNORMAL /HPF
UROBILINOGEN UR QL STRIP: ABNORMAL
WBC # UR STRIP: ABNORMAL /HPF
WBC NRBC COR # BLD: 6.4 10*3/MM3 (ref 3.4–10.8)
WHOLE BLOOD HOLD COAG: NORMAL
WHOLE BLOOD HOLD SPECIMEN: NORMAL

## 2022-06-29 PROCEDURE — 96374 THER/PROPH/DIAG INJ IV PUSH: CPT

## 2022-06-29 PROCEDURE — 25010000002 HYDROMORPHONE 1 MG/ML SOLUTION: Performed by: EMERGENCY MEDICINE

## 2022-06-29 PROCEDURE — 80053 COMPREHEN METABOLIC PANEL: CPT

## 2022-06-29 PROCEDURE — 99283 EMERGENCY DEPT VISIT LOW MDM: CPT

## 2022-06-29 PROCEDURE — 74178 CT ABD&PLV WO CNTR FLWD CNTR: CPT

## 2022-06-29 PROCEDURE — 83690 ASSAY OF LIPASE: CPT

## 2022-06-29 PROCEDURE — 25010000002 ONDANSETRON PER 1 MG: Performed by: EMERGENCY MEDICINE

## 2022-06-29 PROCEDURE — 25010000002 KETOROLAC TROMETHAMINE PER 15 MG: Performed by: EMERGENCY MEDICINE

## 2022-06-29 PROCEDURE — 96376 TX/PRO/DX INJ SAME DRUG ADON: CPT

## 2022-06-29 PROCEDURE — 85025 COMPLETE CBC W/AUTO DIFF WBC: CPT

## 2022-06-29 PROCEDURE — 36415 COLL VENOUS BLD VENIPUNCTURE: CPT

## 2022-06-29 PROCEDURE — 81001 URINALYSIS AUTO W/SCOPE: CPT | Performed by: EMERGENCY MEDICINE

## 2022-06-29 PROCEDURE — 0 IOPAMIDOL PER 1 ML: Performed by: EMERGENCY MEDICINE

## 2022-06-29 PROCEDURE — 96375 TX/PRO/DX INJ NEW DRUG ADDON: CPT

## 2022-06-29 RX ORDER — HYDROCODONE BITARTRATE AND ACETAMINOPHEN 7.5; 325 MG/1; MG/1
1 TABLET ORAL EVERY 6 HOURS PRN
Qty: 20 TABLET | Refills: 0 | Status: SHIPPED | OUTPATIENT
Start: 2022-06-29 | End: 2022-09-20

## 2022-06-29 RX ORDER — KETOROLAC TROMETHAMINE 30 MG/ML
30 INJECTION, SOLUTION INTRAMUSCULAR; INTRAVENOUS ONCE
Status: COMPLETED | OUTPATIENT
Start: 2022-06-29 | End: 2022-06-29

## 2022-06-29 RX ORDER — SODIUM CHLORIDE 0.9 % (FLUSH) 0.9 %
10 SYRINGE (ML) INJECTION AS NEEDED
Status: DISCONTINUED | OUTPATIENT
Start: 2022-06-29 | End: 2022-06-29 | Stop reason: HOSPADM

## 2022-06-29 RX ORDER — ONDANSETRON 2 MG/ML
4 INJECTION INTRAMUSCULAR; INTRAVENOUS ONCE
Status: COMPLETED | OUTPATIENT
Start: 2022-06-29 | End: 2022-06-29

## 2022-06-29 RX ORDER — ONDANSETRON 8 MG/1
8 TABLET, ORALLY DISINTEGRATING ORAL 4 TIMES DAILY PRN
Qty: 20 TABLET | Refills: 0 | Status: SHIPPED | OUTPATIENT
Start: 2022-06-29 | End: 2022-08-01

## 2022-06-29 RX ORDER — KETOROLAC TROMETHAMINE 10 MG/1
10 TABLET, FILM COATED ORAL EVERY 6 HOURS PRN
Qty: 20 TABLET | Refills: 0 | Status: SHIPPED | OUTPATIENT
Start: 2022-06-29 | End: 2022-08-01

## 2022-06-29 RX ORDER — TAMSULOSIN HYDROCHLORIDE 0.4 MG/1
1 CAPSULE ORAL DAILY
Qty: 15 CAPSULE | Refills: 0 | Status: SHIPPED | OUTPATIENT
Start: 2022-06-29 | End: 2022-08-01

## 2022-06-29 RX ADMIN — KETOROLAC TROMETHAMINE 30 MG: 30 INJECTION, SOLUTION INTRAMUSCULAR; INTRAVENOUS at 17:25

## 2022-06-29 RX ADMIN — ONDANSETRON 4 MG: 2 INJECTION INTRAMUSCULAR; INTRAVENOUS at 18:19

## 2022-06-29 RX ADMIN — IOPAMIDOL 100 ML: 755 INJECTION, SOLUTION INTRAVENOUS at 17:44

## 2022-06-29 RX ADMIN — ONDANSETRON 4 MG: 2 INJECTION INTRAMUSCULAR; INTRAVENOUS at 17:26

## 2022-06-29 RX ADMIN — HYDROMORPHONE HYDROCHLORIDE 0.5 MG: 1 INJECTION, SOLUTION INTRAMUSCULAR; INTRAVENOUS; SUBCUTANEOUS at 18:19

## 2022-07-01 ENCOUNTER — OFFICE VISIT (OUTPATIENT)
Dept: UROLOGY | Facility: CLINIC | Age: 52
End: 2022-07-01

## 2022-07-01 VITALS — BODY MASS INDEX: 30.79 KG/M2 | RESPIRATION RATE: 16 BRPM | WEIGHT: 184.8 LBS | HEIGHT: 65 IN

## 2022-07-01 DIAGNOSIS — N20.0 NEPHROLITHIASIS: Primary | ICD-10-CM

## 2022-07-01 PROCEDURE — 99203 OFFICE O/P NEW LOW 30 MIN: CPT | Performed by: UROLOGY

## 2022-07-01 RX ORDER — HYDROCODONE BITARTRATE AND ACETAMINOPHEN 7.5; 325 MG/1; MG/1
1 TABLET ORAL EVERY 6 HOURS PRN
Qty: 15 TABLET | Refills: 0 | Status: SHIPPED | OUTPATIENT
Start: 2022-07-01 | End: 2022-08-01

## 2022-07-01 NOTE — PROGRESS NOTES
Chief Complaint: Urologic complaint    Subjective         History of Present Illness  Noemi Chang is a 51 y.o. female     Nephrolithiasis      3 days of pain.  Pain today.    Some gross hematuria.    currently dealing with Bell's palsy     UA-bacteria negative, TNTC RBC, 0.7, GFR 93  2022 CT abdomen/pelvis with and without - CT abdomen/pelvis with and without - 6 mm stone proximal right ureter.  Punctate bilateral renal calcifications.  2 mm stone lower pole left kidney.  Fatty liver      Lithotripsy x2.  2 passed spontaneously.    No urologic family history    No cardiopulmonary history.  Non-smoker.  No anticoagulation.  Celiac disease              Objective     Past Medical History:   Diagnosis Date   • Celiac disease    • CKD (chronic kidney disease)     STAGE 1   • Kidney stones     FOLLOWED BY DR. STEPHEN BEAR   • SAB (spontaneous )      A2   • Tear of medial meniscus of left knee        Past Surgical History:   Procedure Laterality Date   •  SECTION N/A    •  SECTION N/A    • CHOLECYSTECTOMY N/A    • COLONOSCOPY N/A     NO RECORDS, PER PT WNL, (+) CELIAC DISEASE, DR. WOOD AT [place]   • ENDOSCOPY N/A    • EXTRACORPOREAL SHOCK WAVE LITHOTRIPSY (ESWL) N/A 2018    DR. STEPHEN BEAR AT [place]   • KNEE ARTHROSCOPY W/ MENISCAL REPAIR Left    • SINUS SURGERY N/A    • TONSILLECTOMY AND ADENOIDECTOMY Bilateral          Current Outpatient Medications:   •  buPROPion XL (WELLBUTRIN XL) 150 MG 24 hr tablet, Take 150 mg by mouth Every Morning., Disp: , Rfl:   •  gabapentin (NEURONTIN) 300 MG capsule, Take 300 mg by mouth 3 (Three) Times a Day As Needed., Disp: , Rfl:   •  HYDROcodone-acetaminophen (NORCO) 7.5-325 MG per tablet, Take 1 tablet by mouth Every 6 (Six) Hours As Needed for Moderate Pain ., Disp: 20 tablet, Rfl: 0  •  hydrOXYzine pamoate (VISTARIL) 50 MG capsule, TAKE 1 CAPSULE BY MOUTH TWICE DAILY AS NEEDED FOR PANIC OR  SLEEP, Disp: , Rfl:   •  ketorolac (TORADOL) 10 MG tablet, Take 1 tablet by mouth Every 6 (Six) Hours As Needed for Moderate Pain ., Disp: 20 tablet, Rfl: 0  •  ondansetron ODT (ZOFRAN-ODT) 8 MG disintegrating tablet, Place 1 tablet on the tongue 4 (Four) Times a Day As Needed for Nausea or Vomiting., Disp: 20 tablet, Rfl: 0  •  progesterone (PROMETRIUM) 200 MG capsule, , Disp: , Rfl:   •  sertraline (ZOLOFT) 100 MG tablet, TAKE 1 TABLET BY MOUTH EACH DAY, Disp: , Rfl:   •  tamsulosin (FLOMAX) 0.4 MG capsule 24 hr capsule, Take 1 capsule by mouth Daily., Disp: 15 capsule, Rfl: 0  •  traMADol (ULTRAM) 50 MG tablet, , Disp: , Rfl:     No Known Allergies     Family History   Family history unknown: Yes       Social History     Socioeconomic History   • Marital status:    Tobacco Use   • Smoking status: Never Smoker   • Smokeless tobacco: Never Used   Substance and Sexual Activity   • Alcohol use: Yes     Comment: RARELY   • Drug use: Never   • Sexual activity: Not Currently     Comment: Single.       Vital Signs:   There were no vitals taken for this visit.     Physical exam    Alert and orient x3  Well appearing, well developed, in no acute distress   Unlabored respirations  Nontender/nondistended      Grossly oriented to person, place and time, judgment is intact, normal mood and affect              Assessment and Plan    Diagnoses and all orders for this visit:    1. Nephrolithiasis (Primary)         CT imaging reviewed and discussed with the patient    Records reviewed and summarized in chart  We discussed her conservative trial of passage versus cystoscopy with right ureteroscopy with laser and right ureteral stent placement.  Risks and benefits were discussed including bleeding, infection and damage to the urinary system.  We also discussed the risk of anesthesia up to and including death.  Patient voiced understanding      At this time after discussion we will plan on conservative trial of  passage      Patient understands if she has fever greater than 100.5, intractable nausea/vomiting tractable pain she should go to emergency room    Strain urine    Follow-up in 1 week with MAGNOLIA

## 2022-07-02 NOTE — PROGRESS NOTES
Chief Complaint: Urologic complaint    Subjective         History of Present Illness  Noemi Chang is a 51 y.o. female     Nephrolithiasis      2022 KUB - suspected 4 mm right ureteral stone      Patient not having much pain at this time.  She has not seen the stone pass she has been straining her urine is much as possible.    Minimal gross hematuria    currently dealing with Bell's palsy     UA-bacteria negative, TNTC RBC, 0.7, GFR 93  2022 CT abdomen/pelvis with and without - CT abdomen/pelvis with and without - 6 mm stone proximal right ureter.  Punctate bilateral renal calcifications.  2 mm stone lower pole left kidney.  Fatty liver      Lithotripsy x2.  2 passed spontaneously.    No urologic family history    No cardiopulmonary history.  Non-smoker.  No anticoagulation.  Celiac disease              Objective     Past Medical History:   Diagnosis Date   • Celiac disease    • CKD (chronic kidney disease)     STAGE 1   • Kidney stones     FOLLOWED BY DR. STEPHEN BEAR   • SAB (spontaneous )      A2   • Tear of medial meniscus of left knee        Past Surgical History:   Procedure Laterality Date   •  SECTION N/A    •  SECTION N/A    • CHOLECYSTECTOMY N/A    • COLONOSCOPY N/A     NO RECORDS, PER PT WNL, (+) CELIAC DISEASE, DR. WOOD AT [place]   • ENDOSCOPY N/A    • EXTRACORPOREAL SHOCK WAVE LITHOTRIPSY (ESWL) N/A 2018    DR. STEPHEN BEAR AT [place]   • KNEE ARTHROSCOPY W/ MENISCAL REPAIR Left    • SINUS SURGERY N/A    • TONSILLECTOMY AND ADENOIDECTOMY Bilateral          Current Outpatient Medications:   •  buPROPion XL (WELLBUTRIN XL) 150 MG 24 hr tablet, Take 150 mg by mouth Every Morning., Disp: , Rfl:   •  gabapentin (NEURONTIN) 300 MG capsule, Take 300 mg by mouth 3 (Three) Times a Day As Needed., Disp: , Rfl:   •  HYDROcodone-acetaminophen (NORCO) 7.5-325 MG per tablet, Take 1 tablet by mouth Every 6 (Six) Hours As Needed  for Moderate Pain ., Disp: 20 tablet, Rfl: 0  •  HYDROcodone-acetaminophen (NORCO) 7.5-325 MG per tablet, Take 1 tablet by mouth Every 6 (Six) Hours As Needed for Moderate Pain ., Disp: 15 tablet, Rfl: 0  •  hydrOXYzine pamoate (VISTARIL) 50 MG capsule, TAKE 1 CAPSULE BY MOUTH TWICE DAILY AS NEEDED FOR PANIC OR SLEEP, Disp: , Rfl:   •  ketorolac (TORADOL) 10 MG tablet, Take 1 tablet by mouth Every 6 (Six) Hours As Needed for Moderate Pain ., Disp: 20 tablet, Rfl: 0  •  ondansetron ODT (ZOFRAN-ODT) 8 MG disintegrating tablet, Place 1 tablet on the tongue 4 (Four) Times a Day As Needed for Nausea or Vomiting., Disp: 20 tablet, Rfl: 0  •  progesterone (PROMETRIUM) 200 MG capsule, , Disp: , Rfl:   •  sertraline (ZOLOFT) 100 MG tablet, TAKE 1 TABLET BY MOUTH EACH DAY, Disp: , Rfl:   •  tamsulosin (FLOMAX) 0.4 MG capsule 24 hr capsule, Take 1 capsule by mouth Daily., Disp: 15 capsule, Rfl: 0  •  traMADol (ULTRAM) 50 MG tablet, , Disp: , Rfl:     No Known Allergies     Family History   Family history unknown: Yes       Social History     Socioeconomic History   • Marital status:    Tobacco Use   • Smoking status: Never Smoker   • Smokeless tobacco: Never Used   Substance and Sexual Activity   • Alcohol use: Yes     Comment: RARELY   • Drug use: Never   • Sexual activity: Not Currently     Comment: Single.       Vital Signs:   There were no vitals taken for this visit.     Physical exam    Alert and orient x3  Well appearing, well developed, in no acute distress   Unlabored respirations  Nontender/nondistended      Grossly oriented to person, place and time, judgment is intact, normal mood and affect              Assessment and Plan    Diagnoses and all orders for this visit:    1. Nephrolithiasis (Primary)         KUB shows patient's stone is likely still there.  After discussion of risks/ benefits we will get her set for cystoscopy with right ureteroscopy with laser and right ureteral stent placement.  Risks and  benefits were discussed including bleeding, infection and damage to the urinary system.  We also discussed the risk of anesthesia up to and including death.  Patient voiced understanding and would like to proceed.      Addendum      Patient called back in deciding she wants to postpone surgery.  She will follow-up in a couple weeks with a KUB patient understands she has fever greater than 100.5, intractable nausea/vomiting or tractable pain she should go to the emergency room.

## 2022-07-05 ENCOUNTER — PREP FOR SURGERY (OUTPATIENT)
Dept: OTHER | Facility: HOSPITAL | Age: 52
End: 2022-07-05

## 2022-07-05 ENCOUNTER — HOSPITAL ENCOUNTER (OUTPATIENT)
Dept: GENERAL RADIOLOGY | Facility: HOSPITAL | Age: 52
Discharge: HOME | End: 2022-07-05
Admitting: UROLOGY

## 2022-07-05 ENCOUNTER — OFFICE VISIT (OUTPATIENT)
Dept: UROLOGY | Facility: CLINIC | Age: 52
End: 2022-07-05

## 2022-07-05 VITALS — RESPIRATION RATE: 18 BRPM

## 2022-07-05 DIAGNOSIS — N20.1 URETERAL STONE: Primary | ICD-10-CM

## 2022-07-05 DIAGNOSIS — N20.0 NEPHROLITHIASIS: Primary | ICD-10-CM

## 2022-07-05 DIAGNOSIS — N20.0 NEPHROLITHIASIS: ICD-10-CM

## 2022-07-05 PROCEDURE — 74018 RADEX ABDOMEN 1 VIEW: CPT

## 2022-07-05 PROCEDURE — 99213 OFFICE O/P EST LOW 20 MIN: CPT | Performed by: UROLOGY

## 2022-07-05 RX ORDER — LEVOFLOXACIN 5 MG/ML
500 INJECTION, SOLUTION INTRAVENOUS ONCE
Status: CANCELLED | OUTPATIENT
Start: 2022-07-05 | End: 2022-07-05

## 2022-07-05 RX ORDER — SODIUM CHLORIDE 9 MG/ML
100 INJECTION, SOLUTION INTRAVENOUS CONTINUOUS
Status: CANCELLED | OUTPATIENT
Start: 2022-07-05

## 2022-07-05 NOTE — H&P
[facility]   UROLOGY HISTORY AND PHYSICAL    Patient Name: Noemi Chang  : 1970  MRN: [id number]  Primary Care Physician:  Provider, No Known  Date of admission: (Not on file)    Subjective   Subjective     Chief Complaint: Right ureteral stone    HPI:    Noemi Chang is a 51 y.o. female right ureteral stone    Review of Systems     10 systems reviewed and are negative other than what is listed in HPI    Personal History     Past Medical History:   Diagnosis Date   • Celiac disease    • CKD (chronic kidney disease)     STAGE 1   • Kidney stones     FOLLOWED BY DR. STEPHEN BEAR   • SAB (spontaneous )      A2   • Tear of medial meniscus of left knee        Past Surgical History:   Procedure Laterality Date   •  SECTION N/A    •  SECTION N/A    • CHOLECYSTECTOMY N/A    • COLONOSCOPY N/A     NO RECORDS, PER PT WNL, (+) CELIAC DISEASE, DR. WOOD AT [place]   • ENDOSCOPY N/A    • EXTRACORPOREAL SHOCK WAVE LITHOTRIPSY (ESWL) N/A 2018    DR. STEPHEN BEAR AT [place]   • KNEE ARTHROSCOPY W/ MENISCAL REPAIR Left    • SINUS SURGERY N/A    • TONSILLECTOMY AND ADENOIDECTOMY Bilateral        Family History: Family history is unknown by patient. Otherwise pertinent FHx was reviewed and not pertinent to current issue.    Social History:  reports that she has never smoked. She has never used smokeless tobacco. She reports current alcohol use. She reports that she does not use drugs.    Home Medications:  HYDROcodone-acetaminophen, buPROPion XL, gabapentin, hydrOXYzine pamoate, ketorolac, ondansetron ODT, progesterone, sertraline, tamsulosin, and traMADol      Allergies:  No Known Allergies    Objective   Objective     Vitals:   Resp:  [18] 18  Physical Exam    Constitutional: Awake, alert      Respiratory: Clear to auscultation bilaterally, nonlabored respirations    Cardiovascular: RRR, no murmurs, rubs, or gallops, palpable pedal pulses  bilaterally   Gastrointestinal: Positive bowel sounds, soft, nontender, nondistended    Skin: No rashes     Result Review    Result Review:  I have personally reviewed the results from the time of this admission to 7/5/2022 13:19 EDT and agree with these findings:  []  Laboratory  []  Microbiology  []  Radiology  []  EKG/Telemetry   []  Cardiology/Vascular   []  Pathology  []  Old records  []  Other:    Assessment & Plan   Assessment / Plan     Brief Patient Summary:  Noemi Chang is a 51 y.o. female     Active Hospital Problems:  There are no active hospital problems to display for this patient.      Plan:       Cystoscopy with right ureteroscopy with laser and right ureteral stent placement..  Risks and benefits were discussed including bleeding, infection and damage to the urinary system.  We also discussed the risk of anesthesia up to and including death.  Patient voiced understanding and would like to proceed.    Electronically signed by Tio Barrera MD, 07/05/22, 1:19 PM EDT.

## 2022-07-28 NOTE — PROGRESS NOTES
Chief Complaint: Urologic complaint    Subjective         History of Present Illness  Noemi Chang is a 51 y.o. female     Nephrolithiasis      Patient's had a little bit of intermittent gross hematuria also some intermittent pain.  No severe pain.  No pain medication.    No fevers.      2022 KUB - suspected 4 mm right ureteral stone      Patient not having much pain at this time.  She has not seen the stone pass she has been straining her urine is much as possible.    Minimal gross hematuria    Lithotripsy x2.  2 passed spontaneously.      Previous     UA-bacteria negative, TNTC RBC, 0.7, GFR 93  2022 CT abdomen/pelvis with and without - CT abdomen/pelvis with and without - 6 mm stone proximal right ureter.  Punctate bilateral renal calcifications.  2 mm stone lower pole left kidney.  Fatty liver      No urologic family history    No cardiopulmonary history.  Non-smoker.  No anticoagulation.  Celiac disease      Results for orders placed or performed in visit on 22   POC Urinalysis Dipstick, Automated    Specimen: Urine   Result Value Ref Range    Color Yellow Yellow, Straw, Dark Yellow, Linda    Clarity, UA Clear Clear    Specific Gravity  1.010 1.005 - 1.030    pH, Urine 6.0 5.0 - 8.0    Leukocytes Negative Negative    Nitrite, UA Negative Negative    Protein, POC Negative Negative mg/dL    Glucose, UA Negative Negative mg/dL    Ketones, UA Negative Negative    Urobilinogen, UA Normal Normal    Bilirubin Negative Negative    Blood, UA Trace (A) Negative    Lot Number [id number]     Expiration Date             Objective     Past Medical History:   Diagnosis Date   • Bell's palsy    • Celiac disease    • CKD (chronic kidney disease)     STAGE 1   • Kidney stones     FOLLOWED BY DR. STEPHEN BEAR   • SAB (spontaneous )      A2   • Tear of medial meniscus of left knee        Past Surgical History:   Procedure Laterality Date   •  SECTION N/A    •   SECTION N/A 2006   • CHOLECYSTECTOMY N/A 2017   • COLONOSCOPY N/A 2009    NO RECORDS, PER PT WNL, (+) CELIAC DISEASE, DR. WOOD AT [place]   • ENDOSCOPY N/A 2015   • EXTRACORPOREAL SHOCK WAVE LITHOTRIPSY (ESWL) N/A 09/12/2018    DR. STEPHEN BEAR AT [place]   • KNEE ARTHROSCOPY W/ MENISCAL REPAIR Left 1995   • SINUS SURGERY N/A    • TONSILLECTOMY AND ADENOIDECTOMY Bilateral          Current Outpatient Medications:   •  buPROPion XL (WELLBUTRIN XL) 150 MG 24 hr tablet, Take 150 mg by mouth Every Morning., Disp: , Rfl:   •  gabapentin (NEURONTIN) 300 MG capsule, Take 300 mg by mouth 3 (Three) Times a Day As Needed., Disp: , Rfl:   •  HYDROcodone-acetaminophen (NORCO) 7.5-325 MG per tablet, Take 1 tablet by mouth Every 6 (Six) Hours As Needed for Moderate Pain ., Disp: 20 tablet, Rfl: 0  •  HYDROcodone-acetaminophen (NORCO) 7.5-325 MG per tablet, Take 1 tablet by mouth Every 6 (Six) Hours As Needed for Moderate Pain ., Disp: 15 tablet, Rfl: 0  •  hydrOXYzine pamoate (VISTARIL) 50 MG capsule, TAKE 1 CAPSULE BY MOUTH TWICE DAILY AS NEEDED FOR PANIC OR SLEEP, Disp: , Rfl:   •  ketorolac (TORADOL) 10 MG tablet, Take 1 tablet by mouth Every 6 (Six) Hours As Needed for Moderate Pain ., Disp: 20 tablet, Rfl: 0  •  ondansetron ODT (ZOFRAN-ODT) 8 MG disintegrating tablet, Place 1 tablet on the tongue 4 (Four) Times a Day As Needed for Nausea or Vomiting., Disp: 20 tablet, Rfl: 0  •  progesterone (PROMETRIUM) 200 MG capsule, , Disp: , Rfl:   •  sertraline (ZOLOFT) 100 MG tablet, TAKE 1 TABLET BY MOUTH EACH DAY, Disp: , Rfl:   •  tamsulosin (FLOMAX) 0.4 MG capsule 24 hr capsule, Take 1 capsule by mouth Daily., Disp: 15 capsule, Rfl: 0  •  traMADol (ULTRAM) 50 MG tablet, , Disp: , Rfl:   •  valACYclovir (VALTREX) 500 MG tablet, Take 500 mg by mouth 2 (Two) Times a Day., Disp: , Rfl:     No Known Allergies     Family History   Adopted: Yes   Family history unknown: Yes       Social History     Socioeconomic History   •  Marital status:    Tobacco Use   • Smoking status: Never Smoker   • Smokeless tobacco: Never Used   Vaping Use   • Vaping Use: Never used   Substance and Sexual Activity   • Alcohol use: Yes     Comment: RARELY   • Drug use: Never   • Sexual activity: Not Currently     Comment: Single.       Vital Signs:   There were no vitals taken for this visit.     Physical exam    Alert and orient x3  Well appearing, well developed, in no acute distress   Unlabored respirations      Grossly oriented to person, place and time, judgment is intact, normal mood and affect              Assessment and Plan    Diagnoses and all orders for this visit:    1. Nephrolithiasis (Primary)      Patient t having intermittent pain we do think her stone is still there we will going get her set up for cystoscopy with right ureteroscopy with laser and right ureteral stent placement.  Risks and benefits were discussed including bleeding, infection and damage to the urinary system.  We also discussed the risk of anesthesia up to and including death.  Patient voiced understanding and would like to proceed.      KUB before surgery.

## 2022-07-29 ENCOUNTER — OFFICE VISIT (OUTPATIENT)
Dept: UROLOGY | Facility: CLINIC | Age: 52
End: 2022-07-29

## 2022-07-29 VITALS — RESPIRATION RATE: 16 BRPM | HEIGHT: 65 IN | BODY MASS INDEX: 31.42 KG/M2 | WEIGHT: 188.6 LBS

## 2022-07-29 DIAGNOSIS — N20.0 NEPHROLITHIASIS: Primary | ICD-10-CM

## 2022-07-29 LAB
BILIRUB BLD-MCNC: NEGATIVE MG/DL
CLARITY, POC: CLEAR
COLOR UR: YELLOW
EXPIRATION DATE: ABNORMAL
GLUCOSE UR STRIP-MCNC: NEGATIVE MG/DL
KETONES UR QL: NEGATIVE
LEUKOCYTE EST, POC: NEGATIVE
Lab: ABNORMAL
NITRITE UR-MCNC: NEGATIVE MG/ML
PH UR: 6 [PH] (ref 5–8)
PROT UR STRIP-MCNC: NEGATIVE MG/DL
RBC # UR STRIP: ABNORMAL /UL
SP GR UR: 1.01 (ref 1–1.03)
UROBILINOGEN UR QL: NORMAL

## 2022-07-29 PROCEDURE — 99213 OFFICE O/P EST LOW 20 MIN: CPT | Performed by: UROLOGY

## 2022-07-29 PROCEDURE — 81003 URINALYSIS AUTO W/O SCOPE: CPT | Performed by: UROLOGY

## 2022-07-29 RX ORDER — ESTRADIOL 0.5 MG/.5G
GEL TOPICAL NIGHTLY
COMMUNITY
Start: 2022-07-17 | End: 2023-03-13

## 2022-08-01 ENCOUNTER — TELEPHONE (OUTPATIENT)
Dept: UROLOGY | Facility: CLINIC | Age: 52
End: 2022-08-01

## 2022-08-01 NOTE — TELEPHONE ENCOUNTER
Patient scheduled for ystoscopy with right ureteroscopy with laser and right ureteral stent placement on 08/04/22.    Valentina in [facility] said the patient tested positive for Covid on 07/14/22.  She wants to know if Dr. Barrera is going to deem urgent, since it is for stone, or what he wants to do.

## 2022-08-01 NOTE — TELEPHONE ENCOUNTER
Called to let Valentina know that per Bruce, we are deeming patient urgent for surgery. Also let Maria Esther Rubio in OR scheduling know this.

## 2022-08-02 ENCOUNTER — TELEPHONE (OUTPATIENT)
Dept: UROLOGY | Facility: CLINIC | Age: 52
End: 2022-08-02

## 2022-08-02 NOTE — TELEPHONE ENCOUNTER
Patient scheduled for surgery 08/04/22 for kidney stone.  Does she need to have her x-ray today or tomorrow?  She can go tomorrow.  She had some pain last night where urine comes out and hurt for a while, and is wondering if she might have passed the stone or part of it.

## 2022-08-03 ENCOUNTER — HOSPITAL ENCOUNTER (OUTPATIENT)
Dept: GENERAL RADIOLOGY | Facility: HOSPITAL | Age: 52
Discharge: HOME | End: 2022-08-03
Admitting: UROLOGY

## 2022-08-03 ENCOUNTER — TELEPHONE (OUTPATIENT)
Dept: UROLOGY | Facility: CLINIC | Age: 52
End: 2022-08-03

## 2022-08-03 DIAGNOSIS — N20.0 NEPHROLITHIASIS: ICD-10-CM

## 2022-08-03 PROCEDURE — 74018 RADEX ABDOMEN 1 VIEW: CPT

## 2022-08-03 NOTE — TELEPHONE ENCOUNTER
Patient called to cancel surgery for tomorrow, as she would really like to pass the stone on her own. I reviewed XR with her and advised her that it is in the same spot that it was from last month and is the same size. Canceled surgery and messaged Bruce to determine next steps

## 2022-08-04 DIAGNOSIS — N20.0 NEPHROLITHIASIS: Primary | ICD-10-CM

## 2022-08-24 ENCOUNTER — TELEPHONE (OUTPATIENT)
Dept: UROLOGY | Facility: CLINIC | Age: 52
End: 2022-08-24

## 2022-08-24 NOTE — TELEPHONE ENCOUNTER
Called patient to remind her to do KUB prior to appt Friday. She said she is actually out of town for a couple weeks and will need to reschedule. We rescheduled this to 09/09 at 0800. She is aware to do KUB prior.

## 2022-08-29 ENCOUNTER — TELEPHONE (OUTPATIENT)
Dept: UROLOGY | Facility: CLINIC | Age: 52
End: 2022-08-29

## 2022-08-29 NOTE — TELEPHONE ENCOUNTER
Pt called and spoke to HUB. She said she is in a lot of pain and wants a sooner appt than her 09/09/22 appt. She is coming in for stone f/u with KUB prior. She wanted to speak to the nurse about this, insistent that she needed a sooner appt.

## 2022-08-29 NOTE — TELEPHONE ENCOUNTER
Called pt and read message from Arnoldo regarding stone/sooner appt. Pt voiced understanding. She said she would go get the KUB and I told her we would go from there but if she had pain that she could not tolerate, she should present to the ER. She voiced understanding.

## 2022-08-30 ENCOUNTER — HOSPITAL ENCOUNTER (OUTPATIENT)
Dept: GENERAL RADIOLOGY | Facility: HOSPITAL | Age: 52
Discharge: HOME | End: 2022-08-30
Admitting: UROLOGY

## 2022-08-30 DIAGNOSIS — N20.0 NEPHROLITHIASIS: ICD-10-CM

## 2022-08-30 PROCEDURE — 74018 RADEX ABDOMEN 1 VIEW: CPT

## 2022-09-06 NOTE — PROGRESS NOTES
Chief Complaint: Urologic complaint    Subjective         History of Present Illness  Noemi Chang is a 51 y.o. female       Nephrolithiasis      Patient had fairly severe pain 2 weeks ago over the suprapubic region.  She contributed this to the stone.    Patient for about the last few days been having some pain in her right upper quadrant.  She does not feel like it is the same pain.    No gross hematuria for the last 2 weeks    2022 KUB-negative      Patient not having much pain at this time.  She has not seen the stone pass she has been straining her urine is much as possible.    Lithotripsy x2.  2 passed spontaneously.      Previous    2022 KUB - suspected 4 mm right ureteral stone     UA-bacteria negative, TNTC RBC, 0.7, GFR 93  2022 CT abdomen/pelvis with and without - CT abdomen/pelvis with and without - 6 mm stone proximal right ureter.  Punctate bilateral renal calcifications.  2 mm stone lower pole left kidney.  Fatty liver      No urologic family history    No cardiopulmonary history.  Non-smoker.  No anticoagulation.  Celiac disease      Results for orders placed or performed in visit on 22   POC Urinalysis Dipstick, Automated    Specimen: Urine   Result Value Ref Range    Color Yellow Yellow, Straw, Dark Yellow, Ilnda    Clarity, UA Clear Clear    Specific Gravity  1.010 1.005 - 1.030    pH, Urine 6.0 5.0 - 8.0    Leukocytes Negative Negative    Nitrite, UA Negative Negative    Protein, POC Negative Negative mg/dL    Glucose, UA Negative Negative mg/dL    Ketones, UA Negative Negative    Urobilinogen, UA Normal Normal    Bilirubin Negative Negative    Blood, UA Trace (A) Negative    Lot Number [id number]     Expiration Date             Objective     Past Medical History:   Diagnosis Date   • Bell's palsy    • Celiac disease    • CKD (chronic kidney disease)     STAGE 1 - ASYMPTOMATIC   • Kidney stones     FOLLOWED BY DR. STEPHEN BEAR   • SAB (spontaneous )       A2   • Tear of medial meniscus of left knee        Past Surgical History:   Procedure Laterality Date   •  SECTION N/A    •  SECTION N/A    • CHOLECYSTECTOMY N/A    • COLONOSCOPY N/A     NO RECORDS, PER PT WNL, (+) CELIAC DISEASE, DR. WOOD AT [place]   • ENDOSCOPY N/A    • EXTRACORPOREAL SHOCK WAVE LITHOTRIPSY (ESWL) N/A 2018    DR. STEPHEN BEAR AT [place]   • KNEE ARTHROSCOPY W/ MENISCAL REPAIR Left    • SINUS SURGERY N/A    • TONSILLECTOMY AND ADENOIDECTOMY Bilateral          Current Outpatient Medications:   •  Divigel 0.5 MG/0.5GM gel, APPLY GEL ONCE DAILY TO EITHER LEFT OR RIGHT UPPER THIGH, Disp: , Rfl:   •  HYDROcodone-acetaminophen (NORCO) 7.5-325 MG per tablet, Take 1 tablet by mouth Every 6 (Six) Hours As Needed for Moderate Pain ., Disp: 20 tablet, Rfl: 0  •  hydrOXYzine pamoate (VISTARIL) 50 MG capsule, TAKE 1 CAPSULE BY MOUTH TWICE DAILY AS NEEDED FOR PANIC OR SLEEP, Disp: , Rfl:   •  progesterone (PROMETRIUM) 200 MG capsule, Take 200 mg by mouth Every Night., Disp: , Rfl:   •  sertraline (ZOLOFT) 100 MG tablet, Take 100 mg by mouth Every Night., Disp: , Rfl:   •  valACYclovir (VALTREX) 500 MG tablet, Take 500 mg by mouth 2 (Two) Times a Day., Disp: , Rfl:     No Known Allergies     Family History   Adopted: Yes   Family history unknown: Yes       Social History     Socioeconomic History   • Marital status:    Tobacco Use   • Smoking status: Never Smoker   • Smokeless tobacco: Never Used   Vaping Use   • Vaping Use: Never used   Substance and Sexual Activity   • Alcohol use: Yes     Comment: RARELY   • Drug use: Never   • Sexual activity: Not Currently     Comment: Single.       Vital Signs:   There were no vitals taken for this visit.     Physical exam    Alert and orient x3  Well appearing, well developed, in no acute distress   Unlabored respirations      Some mild tenderness to palpation the right upper quadrant./nondistended.   No rebound or guarding.    Grossly oriented to person, place and time, judgment is intact, normal mood and affect              Assessment and Plan    Diagnoses and all orders for this visit:    1. Nephrolithiasis (Primary)      Patient at this time wants to hold off on CT scan which I think is reasonable.  She has had several CTs in her life.  She does feel like this pain  in  her right upper quadrant is something other than kidney stone.  At this time is hard to tell.  I do tend to agree with her.  It does seem to be higher in separate from where she was having her  stone pain.  We will go ahead and order her renal ultrasound for 1 month.      She will let me know if she starts having what she thinks is kidney stone pain again.  I did discuss there is always some risk of missing a stone that could silently cause problems with her kidneys.  That being said I also do not want to order another CT at this time.  She will continue to follow-up      Understands she must follow-up or could be detrimental to her health

## 2022-09-07 ENCOUNTER — TELEPHONE (OUTPATIENT)
Dept: UROLOGY | Facility: CLINIC | Age: 52
End: 2022-09-07

## 2022-09-07 NOTE — TELEPHONE ENCOUNTER
Patient said she had an x-ray, and was told that it looked like her kidney stone was gone.  She said she has a big bulge close to the right center below her ribs.  She has pain that has moved close to the pelvis.  She is achy and has nausea.  She thinks she saw blood in her urine.  She is not having fever or chills, or issue urinating.      She doesn't know if these issues are from a stone or something else.  What does she need to do?

## 2022-09-08 NOTE — TELEPHONE ENCOUNTER
Called patient to let her know what Dr Barrera said. She said before scheduling any further imaging, she would like to speak with Bruce tomorrow.

## 2022-09-09 ENCOUNTER — OFFICE VISIT (OUTPATIENT)
Dept: UROLOGY | Facility: CLINIC | Age: 52
End: 2022-09-09

## 2022-09-09 VITALS — RESPIRATION RATE: 14 BRPM | HEIGHT: 65 IN | BODY MASS INDEX: 30.06 KG/M2 | WEIGHT: 180.4 LBS

## 2022-09-09 DIAGNOSIS — N20.0 NEPHROLITHIASIS: Primary | ICD-10-CM

## 2022-09-09 PROCEDURE — 99213 OFFICE O/P EST LOW 20 MIN: CPT | Performed by: UROLOGY

## 2022-09-09 RX ORDER — ATOMOXETINE 40 MG/1
40 CAPSULE ORAL 2 TIMES DAILY
COMMUNITY
Start: 2022-09-05

## 2022-09-15 ENCOUNTER — APPOINTMENT (OUTPATIENT)
Dept: WOMENS IMAGING | Facility: HOSPITAL | Age: 52
End: 2022-09-15

## 2022-09-15 PROCEDURE — 77063 BREAST TOMOSYNTHESIS BI: CPT | Performed by: RADIOLOGY

## 2022-09-15 PROCEDURE — 77067 SCR MAMMO BI INCL CAD: CPT | Performed by: RADIOLOGY

## 2022-09-16 ENCOUNTER — TELEPHONE (OUTPATIENT)
Dept: UROLOGY | Facility: CLINIC | Age: 52
End: 2022-09-16

## 2022-09-16 DIAGNOSIS — N20.0 NEPHROLITHIASIS: Primary | ICD-10-CM

## 2022-09-16 NOTE — TELEPHONE ENCOUNTER
Called patient to discuss her s/s. Advised her that Bruce would like to do a CT. I already see a AL scheduled so I asked Bruce which he would prefer her to do and advised patient I will call her back. She verbalized understanding.

## 2022-09-16 NOTE — TELEPHONE ENCOUNTER
Received clarification from Bruce regarding imaging. CT is needed. CT scheduled 09/19/22 at 1300, arrive at 1230 at [facility]. Notified patient of this who verbalized understanding.

## 2022-09-16 NOTE — TELEPHONE ENCOUNTER
Patient said she has not seen stone pass, which she said is 6 mm.  She thinks she still has it.  She said she has a sharp pain in her pelvis, that is not constant.  She has urgency and urinary frequency, but not a lot of urine comes out.  It is more than a dribble.  No fever, chills, nausea or vomiting.  She saw gynecologist yesterday, and said there was blood seen in her urine.    Patient ran out of Flomax a couple months ago, so she hasn't taken any.  I verified the pharmacy in chart with patient.    She has a renal US scheduled 09/19/22.      Patient said that Dr. Barrera was going to monitor her, and that she was to f/u in a month.  Her appointment is 10/21/22.    She doesn't know if it is okay to wait until after US, since she is having trouble urinatinG.

## 2022-09-19 ENCOUNTER — PREP FOR SURGERY (OUTPATIENT)
Dept: OTHER | Facility: HOSPITAL | Age: 52
End: 2022-09-19

## 2022-09-19 ENCOUNTER — HOSPITAL ENCOUNTER (OUTPATIENT)
Dept: CT IMAGING | Facility: HOSPITAL | Age: 52
Discharge: HOME | End: 2022-09-19
Admitting: UROLOGY

## 2022-09-19 ENCOUNTER — APPOINTMENT (OUTPATIENT)
Dept: ULTRASOUND IMAGING | Facility: HOSPITAL | Age: 52
End: 2022-09-19

## 2022-09-19 DIAGNOSIS — N20.0 NEPHROLITHIASIS: ICD-10-CM

## 2022-09-19 PROCEDURE — 74176 CT ABD & PELVIS W/O CONTRAST: CPT

## 2022-09-20 ENCOUNTER — TELEPHONE (OUTPATIENT)
Dept: UROLOGY | Facility: CLINIC | Age: 52
End: 2022-09-20

## 2022-09-20 ENCOUNTER — LAB (OUTPATIENT)
Dept: LAB | Facility: HOSPITAL | Age: 52
End: 2022-09-20

## 2022-09-20 DIAGNOSIS — R30.0 DYSURIA: Primary | ICD-10-CM

## 2022-09-20 DIAGNOSIS — R30.0 DYSURIA: ICD-10-CM

## 2022-09-20 LAB
BACTERIA UR QL AUTO: ABNORMAL /HPF
BILIRUB UR QL STRIP: NEGATIVE
CLARITY UR: CLEAR
COLOR UR: YELLOW
GLUCOSE UR STRIP-MCNC: NEGATIVE MG/DL
HGB UR QL STRIP.AUTO: NEGATIVE
KETONES UR QL STRIP: NEGATIVE
LEUKOCYTE ESTERASE UR QL STRIP.AUTO: NEGATIVE
NITRITE UR QL STRIP: NEGATIVE
PH UR STRIP.AUTO: 7 [PH] (ref 5–8)
PROT UR QL STRIP: NEGATIVE
RBC # UR STRIP: ABNORMAL /HPF
REF LAB TEST METHOD: ABNORMAL
SP GR UR STRIP: 1.02 (ref 1–1.03)
SQUAMOUS #/AREA URNS HPF: ABNORMAL /HPF
UROBILINOGEN UR QL STRIP: NORMAL
WBC # UR STRIP: ABNORMAL /HPF

## 2022-09-20 PROCEDURE — 81001 URINALYSIS AUTO W/SCOPE: CPT

## 2022-09-20 RX ORDER — TRAMADOL HYDROCHLORIDE 50 MG/1
50 TABLET ORAL EVERY 6 HOURS PRN
COMMUNITY
End: 2023-03-13

## 2022-09-20 NOTE — H&P (VIEW-ONLY)
[facility]   UROLOGY HISTORY AND PHYSICAL    Patient Name: Noemi Chang  : 1970  MRN: [id number]  Primary Care Physician:  Provider, No Known  Date of admission: (Not on file)    Subjective   Subjective     Chief Complaint:   Right ureteral stone    HPI:    Noemi Chang is a 51 y.o. female right ureteral stone      No changes to H&P    Review of Systems     10 systems reviewed and are negative other than what is listed in HPI    Personal History     Past Medical History:   Diagnosis Date   • Bell's palsy    • Celiac disease    • CKD (chronic kidney disease)     STAGE 1 - ASYMPTOMATIC   • Kidney stones     FOLLOWED BY DR. STEPHEN BEAR   • SAB (spontaneous )      A2   • Tear of medial meniscus of left knee        Past Surgical History:   Procedure Laterality Date   •  SECTION N/A    •  SECTION N/A    • CHOLECYSTECTOMY N/A    • COLONOSCOPY N/A     NO RECORDS, PER PT WNL, (+) CELIAC DISEASE, DR. WOOD AT [place]   • ENDOSCOPY N/A    • EXTRACORPOREAL SHOCK WAVE LITHOTRIPSY (ESWL) N/A 2018    DR. STEPHEN BEAR AT [place]   • KNEE ARTHROSCOPY W/ MENISCAL REPAIR Left    • SINUS SURGERY N/A    • TONSILLECTOMY AND ADENOIDECTOMY Bilateral        Family History: She was adopted. Family history is unknown by patient. Otherwise pertinent FHx was reviewed and not pertinent to current issue.    Social History:  reports that she has never smoked. She has never used smokeless tobacco. She reports current alcohol use. She reports that she does not use drugs.    Home Medications:  HYDROcodone-acetaminophen, atomoxetine, estradiol, hydrOXYzine pamoate, progesterone, sertraline, and valACYclovir      Allergies:  No Known Allergies    Objective   Objective     Vitals:      Physical Exam    Constitutional: Awake, alert    Respiratory: Clear to auscultation bilaterally, nonlabored respirations    Cardiovascular: RRR, no murmurs, rubs, or gallops,  palpable pedal pulses bilaterally   Gastrointestinal: Positive bowel sounds, soft, nontender, nondistended    Skin: No rashes     Result Review    Result Review:  I have personally reviewed the results from the time of this admission to 9/19/2022 22:50 EDT and agree with these findings:  []  Laboratory  []  Microbiology  []  Radiology  []  EKG/Telemetry   []  Cardiology/Vascular   []  Pathology  []  Old records  []  Other:    Assessment & Plan   Assessment / Plan     Brief Patient Summary:  Noemi Chang is a 51 y.o. female     Active Hospital Problems:  There are no active hospital problems to display for this patient.    Right ureteral stone    Plan:     Cystoscopy with right ureteroscopy with laser and right ureteral stent placement.  Risks and benefits were discussed including bleeding, infection and damage to the urinary system.  We also discussed the risk of anesthesia up to and including death.  Patient voiced understanding and would like to proceed.    Electronically signed by Tio Barrera MD, 09/19/22, 10:50 PM EDT.

## 2022-09-20 NOTE — H&P
[facility]   UROLOGY HISTORY AND PHYSICAL    Patient Name: Noemi Chang  : 1970  MRN: [id number]  Primary Care Physician:  Provider, No Known  Date of admission: (Not on file)    Subjective   Subjective     Chief Complaint:   Right ureteral stone    HPI:    Noemi Chang is a 51 y.o. female right ureteral stone      No changes to H&P    Review of Systems     10 systems reviewed and are negative other than what is listed in HPI    Personal History     Past Medical History:   Diagnosis Date   • Bell's palsy    • Celiac disease    • CKD (chronic kidney disease)     STAGE 1 - ASYMPTOMATIC   • Kidney stones     FOLLOWED BY DR. STEPHEN BEAR   • SAB (spontaneous )      A2   • Tear of medial meniscus of left knee        Past Surgical History:   Procedure Laterality Date   •  SECTION N/A    •  SECTION N/A    • CHOLECYSTECTOMY N/A    • COLONOSCOPY N/A     NO RECORDS, PER PT WNL, (+) CELIAC DISEASE, DR. WOOD AT [place]   • ENDOSCOPY N/A    • EXTRACORPOREAL SHOCK WAVE LITHOTRIPSY (ESWL) N/A 2018    DR. STEPHEN BEAR AT [place]   • KNEE ARTHROSCOPY W/ MENISCAL REPAIR Left    • SINUS SURGERY N/A    • TONSILLECTOMY AND ADENOIDECTOMY Bilateral        Family History: She was adopted. Family history is unknown by patient. Otherwise pertinent FHx was reviewed and not pertinent to current issue.    Social History:  reports that she has never smoked. She has never used smokeless tobacco. She reports current alcohol use. She reports that she does not use drugs.    Home Medications:  HYDROcodone-acetaminophen, atomoxetine, estradiol, hydrOXYzine pamoate, progesterone, sertraline, and valACYclovir      Allergies:  No Known Allergies    Objective   Objective     Vitals:      Physical Exam    Constitutional: Awake, alert    Respiratory: Clear to auscultation bilaterally, nonlabored respirations    Cardiovascular: RRR, no murmurs, rubs, or gallops,  palpable pedal pulses bilaterally   Gastrointestinal: Positive bowel sounds, soft, nontender, nondistended    Skin: No rashes     Result Review    Result Review:  I have personally reviewed the results from the time of this admission to 9/19/2022 22:50 EDT and agree with these findings:  []  Laboratory  []  Microbiology  []  Radiology  []  EKG/Telemetry   []  Cardiology/Vascular   []  Pathology  []  Old records  []  Other:    Assessment & Plan   Assessment / Plan     Brief Patient Summary:  Noemi Chang is a 51 y.o. female     Active Hospital Problems:  There are no active hospital problems to display for this patient.    Right ureteral stone    Plan:     Cystoscopy with right ureteroscopy with laser and right ureteral stent placement.  Risks and benefits were discussed including bleeding, infection and damage to the urinary system.  We also discussed the risk of anesthesia up to and including death.  Patient voiced understanding and would like to proceed.    Electronically signed by Tio Barrera MD, 09/19/22, 10:50 PM EDT.

## 2022-09-20 NOTE — TELEPHONE ENCOUNTER
Spoke with patient, we will get her scheduled for Friday. She voiced understanding. She will drop off urine sample today.

## 2022-09-20 NOTE — PRE-PROCEDURE INSTRUCTIONS
PRE-OP INSTRUCTIONS REVIEWED WITH PATIENT: FASTING/BATHING/ARRIVAL PROCEDURES.  INSTRUCTED TO TAKE A.M. DAY OF SURGERY: PRN: TRAMADOL, VISTARIL  UNDERSTANDING VERBALIZED.

## 2022-09-20 NOTE — TELEPHONE ENCOUNTER
----- Message from Tio Barrera MD sent at 9/19/2022 10:53 PM EDT -----  Patient with CT yesterday shows  stone is still there.  Patient needs cystoscopy with right ureteroscopy with laser and right renal stent placement.  do this Friday afternoon to follow the other surgery.  Drop off a UA with micro ASAP

## 2022-09-21 ENCOUNTER — HOSPITAL ENCOUNTER (OUTPATIENT)
Facility: HOSPITAL | Age: 52
Setting detail: HOSPITAL OUTPATIENT SURGERY
Discharge: HOME | End: 2022-09-21
Attending: UROLOGY | Admitting: UROLOGY

## 2022-09-21 ENCOUNTER — APPOINTMENT (OUTPATIENT)
Dept: GENERAL RADIOLOGY | Facility: HOSPITAL | Age: 52
End: 2022-09-21

## 2022-09-21 ENCOUNTER — ANESTHESIA EVENT (OUTPATIENT)
Dept: PERIOP | Facility: HOSPITAL | Age: 52
End: 2022-09-21

## 2022-09-21 ENCOUNTER — ANESTHESIA (OUTPATIENT)
Dept: PERIOP | Facility: HOSPITAL | Age: 52
End: 2022-09-21

## 2022-09-21 VITALS
TEMPERATURE: 96.9 F | WEIGHT: 182.32 LBS | BODY MASS INDEX: 30.38 KG/M2 | HEART RATE: 91 BPM | DIASTOLIC BLOOD PRESSURE: 90 MMHG | OXYGEN SATURATION: 96 % | HEIGHT: 65 IN | SYSTOLIC BLOOD PRESSURE: 148 MMHG | RESPIRATION RATE: 16 BRPM

## 2022-09-21 DIAGNOSIS — N20.0 NEPHROLITHIASIS: Primary | ICD-10-CM

## 2022-09-21 DIAGNOSIS — N20.1 URETERAL STONE: ICD-10-CM

## 2022-09-21 LAB — QT INTERVAL: 390 MS

## 2022-09-21 PROCEDURE — C2617 STENT, NON-COR, TEM W/O DEL: HCPCS | Performed by: UROLOGY

## 2022-09-21 PROCEDURE — 74018 RADEX ABDOMEN 1 VIEW: CPT | Performed by: UROLOGY

## 2022-09-21 PROCEDURE — 25010000002 KETOROLAC TROMETHAMINE PER 15 MG: Performed by: MARRIAGE & FAMILY THERAPIST

## 2022-09-21 PROCEDURE — 25010000002 FENTANYL CITRATE (PF) 50 MCG/ML SOLUTION: Performed by: MARRIAGE & FAMILY THERAPIST

## 2022-09-21 PROCEDURE — 25010000002 DEXAMETHASONE PER 1 MG: Performed by: MARRIAGE & FAMILY THERAPIST

## 2022-09-21 PROCEDURE — 25010000002 PROPOFOL 10 MG/ML EMULSION: Performed by: MARRIAGE & FAMILY THERAPIST

## 2022-09-21 PROCEDURE — 93005 ELECTROCARDIOGRAM TRACING: CPT | Performed by: ANESTHESIOLOGY

## 2022-09-21 PROCEDURE — 25010000002 LEVOFLOXACIN PER 250 MG: Performed by: UROLOGY

## 2022-09-21 PROCEDURE — C1769 GUIDE WIRE: HCPCS | Performed by: UROLOGY

## 2022-09-21 PROCEDURE — 25010000002 MIDAZOLAM PER 1 MG: Performed by: ANESTHESIOLOGY

## 2022-09-21 PROCEDURE — 82365 CALCULUS SPECTROSCOPY: CPT | Performed by: UROLOGY

## 2022-09-21 PROCEDURE — 25010000002 ONDANSETRON PER 1 MG: Performed by: MARRIAGE & FAMILY THERAPIST

## 2022-09-21 PROCEDURE — 88300 SURGICAL PATH GROSS: CPT | Performed by: UROLOGY

## 2022-09-21 PROCEDURE — 76000 FLUOROSCOPY <1 HR PHYS/QHP: CPT

## 2022-09-21 PROCEDURE — 25010000002 HYDROMORPHONE 1 MG/ML SOLUTION: Performed by: MARRIAGE & FAMILY THERAPIST

## 2022-09-21 PROCEDURE — 52356 CYSTO/URETERO W/LITHOTRIPSY: CPT | Performed by: UROLOGY

## 2022-09-21 PROCEDURE — 93010 ELECTROCARDIOGRAM REPORT: CPT | Performed by: INTERNAL MEDICINE

## 2022-09-21 DEVICE — STNT CLASSC DBL PIG HC UNCOAT 6F 26CM: Type: IMPLANTABLE DEVICE | Status: FUNCTIONAL

## 2022-09-21 RX ORDER — MEPERIDINE HYDROCHLORIDE 25 MG/ML
12.5 INJECTION INTRAMUSCULAR; INTRAVENOUS; SUBCUTANEOUS
Status: DISCONTINUED | OUTPATIENT
Start: 2022-09-21 | End: 2022-09-21 | Stop reason: HOSPADM

## 2022-09-21 RX ORDER — FENTANYL CITRATE 50 UG/ML
INJECTION, SOLUTION INTRAMUSCULAR; INTRAVENOUS AS NEEDED
Status: DISCONTINUED | OUTPATIENT
Start: 2022-09-21 | End: 2022-09-21 | Stop reason: SURG

## 2022-09-21 RX ORDER — MIDAZOLAM HYDROCHLORIDE 1 MG/ML
2 INJECTION INTRAMUSCULAR; INTRAVENOUS ONCE
Status: COMPLETED | OUTPATIENT
Start: 2022-09-21 | End: 2022-09-21

## 2022-09-21 RX ORDER — LEVOFLOXACIN 5 MG/ML
500 INJECTION, SOLUTION INTRAVENOUS ONCE
Status: COMPLETED | OUTPATIENT
Start: 2022-09-21 | End: 2022-09-21

## 2022-09-21 RX ORDER — ONDANSETRON 4 MG/1
4 TABLET, FILM COATED ORAL ONCE AS NEEDED
Status: DISCONTINUED | OUTPATIENT
Start: 2022-09-21 | End: 2022-09-21 | Stop reason: HOSPADM

## 2022-09-21 RX ORDER — ACETAMINOPHEN 500 MG
1000 TABLET ORAL ONCE
Status: COMPLETED | OUTPATIENT
Start: 2022-09-21 | End: 2022-09-21

## 2022-09-21 RX ORDER — OXYCODONE HYDROCHLORIDE 5 MG/1
5 TABLET ORAL
Status: DISCONTINUED | OUTPATIENT
Start: 2022-09-21 | End: 2022-09-21 | Stop reason: HOSPADM

## 2022-09-21 RX ORDER — PROMETHAZINE HYDROCHLORIDE 25 MG/1
25 SUPPOSITORY RECTAL ONCE AS NEEDED
Status: DISCONTINUED | OUTPATIENT
Start: 2022-09-21 | End: 2022-09-21 | Stop reason: HOSPADM

## 2022-09-21 RX ORDER — HYDROCODONE BITARTRATE AND ACETAMINOPHEN 5; 325 MG/1; MG/1
1 TABLET ORAL ONCE AS NEEDED
Status: DISCONTINUED | OUTPATIENT
Start: 2022-09-21 | End: 2022-09-21 | Stop reason: HOSPADM

## 2022-09-21 RX ORDER — KETOROLAC TROMETHAMINE 30 MG/ML
INJECTION, SOLUTION INTRAMUSCULAR; INTRAVENOUS AS NEEDED
Status: DISCONTINUED | OUTPATIENT
Start: 2022-09-21 | End: 2022-09-21 | Stop reason: SURG

## 2022-09-21 RX ORDER — ACETAMINOPHEN 325 MG/1
650 TABLET ORAL ONCE
Status: DISCONTINUED | OUTPATIENT
Start: 2022-09-21 | End: 2022-09-21 | Stop reason: HOSPADM

## 2022-09-21 RX ORDER — ONDANSETRON 2 MG/ML
4 INJECTION INTRAMUSCULAR; INTRAVENOUS ONCE AS NEEDED
Status: DISCONTINUED | OUTPATIENT
Start: 2022-09-21 | End: 2022-09-21 | Stop reason: HOSPADM

## 2022-09-21 RX ORDER — SODIUM CHLORIDE 9 MG/ML
100 INJECTION, SOLUTION INTRAVENOUS CONTINUOUS
Status: DISCONTINUED | OUTPATIENT
Start: 2022-09-21 | End: 2022-09-21 | Stop reason: HOSPADM

## 2022-09-21 RX ORDER — LIDOCAINE HYDROCHLORIDE 20 MG/ML
INJECTION, SOLUTION EPIDURAL; INFILTRATION; INTRACAUDAL; PERINEURAL AS NEEDED
Status: DISCONTINUED | OUTPATIENT
Start: 2022-09-21 | End: 2022-09-21 | Stop reason: SURG

## 2022-09-21 RX ORDER — ROCURONIUM BROMIDE 10 MG/ML
INJECTION, SOLUTION INTRAVENOUS AS NEEDED
Status: DISCONTINUED | OUTPATIENT
Start: 2022-09-21 | End: 2022-09-21 | Stop reason: SURG

## 2022-09-21 RX ORDER — SODIUM CHLORIDE, SODIUM LACTATE, POTASSIUM CHLORIDE, CALCIUM CHLORIDE 600; 310; 30; 20 MG/100ML; MG/100ML; MG/100ML; MG/100ML
9 INJECTION, SOLUTION INTRAVENOUS CONTINUOUS PRN
Status: DISCONTINUED | OUTPATIENT
Start: 2022-09-21 | End: 2022-09-21 | Stop reason: HOSPADM

## 2022-09-21 RX ORDER — PROMETHAZINE HYDROCHLORIDE 12.5 MG/1
12.5 TABLET ORAL ONCE AS NEEDED
Status: DISCONTINUED | OUTPATIENT
Start: 2022-09-21 | End: 2022-09-21 | Stop reason: HOSPADM

## 2022-09-21 RX ORDER — MAGNESIUM HYDROXIDE 1200 MG/15ML
LIQUID ORAL AS NEEDED
Status: DISCONTINUED | OUTPATIENT
Start: 2022-09-21 | End: 2022-09-21 | Stop reason: HOSPADM

## 2022-09-21 RX ORDER — GLYCOPYRROLATE 0.2 MG/ML
0.2 INJECTION INTRAMUSCULAR; INTRAVENOUS
Status: COMPLETED | OUTPATIENT
Start: 2022-09-21 | End: 2022-09-21

## 2022-09-21 RX ORDER — DEXAMETHASONE SODIUM PHOSPHATE 4 MG/ML
INJECTION, SOLUTION INTRA-ARTICULAR; INTRALESIONAL; INTRAMUSCULAR; INTRAVENOUS; SOFT TISSUE AS NEEDED
Status: DISCONTINUED | OUTPATIENT
Start: 2022-09-21 | End: 2022-09-21 | Stop reason: SURG

## 2022-09-21 RX ORDER — HYDROCODONE BITARTRATE AND ACETAMINOPHEN 5; 325 MG/1; MG/1
1 TABLET ORAL EVERY 6 HOURS PRN
Qty: 12 TABLET | Refills: 0 | Status: SHIPPED | OUTPATIENT
Start: 2022-09-21 | End: 2023-03-13

## 2022-09-21 RX ORDER — PROPOFOL 10 MG/ML
VIAL (ML) INTRAVENOUS AS NEEDED
Status: DISCONTINUED | OUTPATIENT
Start: 2022-09-21 | End: 2022-09-21 | Stop reason: SURG

## 2022-09-21 RX ORDER — ONDANSETRON 2 MG/ML
INJECTION INTRAMUSCULAR; INTRAVENOUS AS NEEDED
Status: DISCONTINUED | OUTPATIENT
Start: 2022-09-21 | End: 2022-09-21 | Stop reason: SURG

## 2022-09-21 RX ORDER — PROMETHAZINE HYDROCHLORIDE 12.5 MG/1
25 TABLET ORAL ONCE AS NEEDED
Status: DISCONTINUED | OUTPATIENT
Start: 2022-09-21 | End: 2022-09-21 | Stop reason: HOSPADM

## 2022-09-21 RX ADMIN — FENTANYL CITRATE 100 MCG: 50 INJECTION, SOLUTION INTRAMUSCULAR; INTRAVENOUS at 13:37

## 2022-09-21 RX ADMIN — LEVOFLOXACIN 500 MG: 500 INJECTION, SOLUTION INTRAVENOUS at 13:42

## 2022-09-21 RX ADMIN — OXYCODONE HYDROCHLORIDE 5 MG: 5 TABLET ORAL at 15:06

## 2022-09-21 RX ADMIN — ACETAMINOPHEN 1000 MG: 500 TABLET, FILM COATED ORAL at 13:26

## 2022-09-21 RX ADMIN — HYDROMORPHONE HYDROCHLORIDE 0.5 MG: 1 INJECTION, SOLUTION INTRAMUSCULAR; INTRAVENOUS; SUBCUTANEOUS at 15:04

## 2022-09-21 RX ADMIN — MIDAZOLAM HYDROCHLORIDE 2 MG: 1 INJECTION, SOLUTION INTRAMUSCULAR; INTRAVENOUS at 13:27

## 2022-09-21 RX ADMIN — DEXAMETHASONE SODIUM PHOSPHATE 4 MG: 4 INJECTION, SOLUTION INTRA-ARTICULAR; INTRALESIONAL; INTRAMUSCULAR; INTRAVENOUS; SOFT TISSUE at 13:42

## 2022-09-21 RX ADMIN — SUGAMMADEX 200 MG: 100 INJECTION, SOLUTION INTRAVENOUS at 14:02

## 2022-09-21 RX ADMIN — SODIUM CHLORIDE, POTASSIUM CHLORIDE, SODIUM LACTATE AND CALCIUM CHLORIDE 9 ML/HR: 600; 310; 30; 20 INJECTION, SOLUTION INTRAVENOUS at 13:27

## 2022-09-21 RX ADMIN — LIDOCAINE HYDROCHLORIDE 50 MG: 20 INJECTION, SOLUTION EPIDURAL; INFILTRATION; INTRACAUDAL; PERINEURAL at 13:37

## 2022-09-21 RX ADMIN — GLYCOPYRROLATE 0.2 MG: 0.2 INJECTION INTRAMUSCULAR; INTRAVENOUS at 13:27

## 2022-09-21 RX ADMIN — KETOROLAC TROMETHAMINE 30 MG: 30 INJECTION, SOLUTION INTRAMUSCULAR; INTRAVENOUS at 14:12

## 2022-09-21 RX ADMIN — ONDANSETRON 4 MG: 2 INJECTION INTRAMUSCULAR; INTRAVENOUS at 13:42

## 2022-09-21 RX ADMIN — ROCURONIUM BROMIDE 50 MG: 10 INJECTION INTRAVENOUS at 13:37

## 2022-09-21 RX ADMIN — PROPOFOL 150 MG: 10 INJECTION, EMULSION INTRAVENOUS at 13:37

## 2022-09-21 NOTE — OP NOTE
CYSTOSCOPY URETEROSCOPY  Procedure Report    Patient Name:  Noemi Chang  YOB: 1970    Date of Surgery:  9/21/2022      Pre-op Diagnosis:   Ureteral stone [N20.1]       Postop diagnosis:    Same    Procedure/CPT® Codes:      Procedure(s):    Cystoscopy    right ureteroscopy with laser and basket extraction of stone   right ureteral stent placement, 6 x 26 with string left on    Staff:  Surgeon(s):  Tio Barrera MD         Anesthesia: General    Estimated Blood Loss: minimal    Implants:    Implant Name Type Inv. Item Serial No.  Lot No. LRB No. Used Action   6fr x 26 cm ureteral stent     [id number] Right 1 Implanted       Specimen:          Specimens     ID Source Type Tests Collected By Collected At Frozen?    A Kidney, Right Calculus · TISSUE PATHOLOGY EXAM  · STONE ANALYSIS   Tio Barrera MD 9/21/22 4065     Description: RIGHT KIDNEY STONE    This specimen was not marked as sent.              Findings:     Normal bladder    All stones removed from the right side    String left on stent    Complications: none    Description of Procedure:       After informed consent patient taken to the operating room.  Patient was laid supine and placed under general anesthesia by the anesthesia team.  At this point patient was placed in dorsal lithotomy position and prepped and draped in normal sterile fashion.  A multidisciplinary timeout was undertaken documenting the correct patient site and procedure.  At this point a 22 rigid cystoscope was placed into the urethra . Bladder was normal.  At this point a Glidewire was placed up the right   ureter without any issue under fluoroscopic guidance.     Semirigid ureteroscope was taken into the distal right ureter.  Stone was identified right inside the right UO 6 mm.     Stone was lasered into multiple small fragments with a 272 µm laser fiber and then these pieces were basketed out with a no tip nitinol basket. There were no further  stones.    Right side was free of stones.  A 6 x 26 ureteral stent was then placed over the Glidewire through a rigid cystoscope without issue and had a good curl in the bladder under direct vision and a good curl in the   Right renal pelvis under fluoroscopy.  Bladder was drained.  Patient tolerated the procedure well, he was taken to the postanesthesia care unit without issue.      String left on stent      Tio Barrera MD     Date: 9/21/2022  Time: 14:04 EDT

## 2022-09-21 NOTE — DISCHARGE INSTRUCTIONS
DISCHARGE INSTRUCTIONS  Extracorporeal Shock Wave Lithotripsy (ESWL)/ Ureteroscopy Lasertripsy      For your surgery you had:  General anesthesia (you may have a sore throat for the first 24 hours)  You have received an anesthesia medication today that can cause hormonal forms of birth control to be ineffective. You should use a different form of birth control (to prevent pregnancy) for 7 days.   You may experience dizziness, drowsiness, or lightheadedness for several hours following surgery.  Do not stay alone today or tonight.  Limit your activity for 24 hours.  You should not drive or operate machinery, drink alcohol, or sign legally binding documents for 24 hours or while you are taking pain medication.  Resume your diet slowly.  Follow any special dietary instructions you may have been given by your doctor.     NOTIFY YOUR DOCTOR IF YOU EXPERIENCE ANY OF THE FOLLOWING:  Temperature greater than 101 degrees Fahrenheit  Shaking Chills  Redness or excessive drainage from incision  Nausea, vomiting and/or pain that is not controlled by prescribed medications  Increase in bleeding or bleeding that is excessive  Unable to urinate in 6 hours after surgery  If unable to reach your doctor, please go to the closest Emergency Room  Strain urine if instructed by physician.  Collect any fragments and take with you on your scheduled appointment. You may pass stone pieces or small blood clots.  Blood in your urine is normal.  It could be light pink to cherry color.  Drink 6-8 glasses of fluid each day to assist with passing of stone fragments.  Back pain is common.  It may feel like a dull ache or back spasm.  Urine will be bloody for several days.  Slight redness or bruising may be noticed on treated side.  If you have difficulty urinating, try sitting in a bathtub of warm water.    If you have a stent, it must be managed by your urologist.  Do NOT forget.  Medications per physician instructions as indicated on Discharge  Medication Information Sheet.    SPECIAL INSTRUCTIONS:  Pull stent by string on Sunday morning.    Last dose of pain medication was given at:  Tylenol (1000mg) last at 1:26pm. Do not exceed 4000mg of tylenol in a 24 hour period.  Toradol last at 2:12pm. May take ibuprofen next at 8:12pm if able and needed.  Oxycodone last at 3:06pm. May take norco next at 7:06pm if needed.

## 2022-09-21 NOTE — ANESTHESIA PREPROCEDURE EVALUATION
Anesthesia Evaluation     Patient summary reviewed and Nursing notes reviewed   no history of anesthetic complications:  NPO Solid Status: > 8 hours  NPO Liquid Status: > 2 hours           Airway   Mallampati: II  TM distance: >3 FB  Neck ROM: full  No difficulty expected  Dental      Pulmonary - negative pulmonary ROS and normal exam    breath sounds clear to auscultation  Cardiovascular - negative cardio ROS and normal exam  Exercise tolerance: good (4-7 METS)    Rhythm: regular  Rate: normal        Neuro/Psych  (+) numbness,    GI/Hepatic/Renal/Endo    (+)   liver disease fatty liver disease, renal disease CRI and stones,     Musculoskeletal (-) negative ROS    Abdominal    Substance History - negative use     OB/GYN negative ob/gyn ROS         Other - negative ROS       ROS/Med Hx Other: PAT Nursing Notes unavailable.             Latest Reference Range & Units Most Recent   Glucose 65 - 99 mg/dL 207 (H)  06/29/22 13:50   Sodium 136 - 145 mmol/L 137  06/29/22 13:50   Potassium 3.5 - 5.2 mmol/L 3.8  06/29/22 13:50   CO2 22.0 - 29.0 mmol/L 21.2 (L)  06/29/22 13:50   CO2 20 - 29 mmol/L 23  01/06/22 10:33   Chloride 98 - 107 mmol/L 103  06/29/22 13:50   Anion Gap 5.0 - 15.0 mmol/L 12.8  06/29/22 13:50   Creatinine 0.57 - 1.00 mg/dL 0.76  06/29/22 13:50   BUN 6 - 20 mg/dL 15  06/29/22 13:50   BUN/Creatinine Ratio 7.0 - 25.0  19.7  06/29/22 13:50   Calcium 8.6 - 10.5 mg/dL 9.6  06/29/22 13:50   eGFR >60.0 mL/min/1.73 93.8  06/29/22 13:50   eGFR Non African Am >59 mL/min/1.73 100  01/06/22 10:33   eGFR African Am >59 mL/min/1.73 115  01/06/22 10:33   GFR MDRD Non African American mL/min/1.73 sq.M 88  01/04/22 09:50   Est GFR by Clearance >60 /1.73 m2 >60 (E)  01/09/20 09:34   Alkaline Phosphatase 39 - 117 U/L 96  06/29/22 13:50   Total Protein 6.0 - 8.5 g/dL 7.0  06/29/22 13:50   ALT (SGPT) 1 - 33 U/L 22 06/29/22 13:50   AST (SGOT) 1 - 32 U/L 15  06/29/22 13:50   Total Bilirubin 0.0 - 1.2 mg/dL 0.3  06/29/22 13:50    Albumin 3.50 - 5.20 g/dL 4.40  06/29/22 13:50   Globulin gm/dL 2.6  06/29/22 13:50   A/G Ratio g/dL 1.7  06/29/22 13:50   (H): Data is abnormally high  (L): Data is abnormally low  (E): External lab result    NORMAL ECG -  Sinus rhythm  No previous ECG available for comparison         Anesthesia Plan    ASA 2     general     (Patient understands anesthesia not responsible for dental damage.    Pt has bell's palsy (since 06/22) so pts left eye doesn't close, asymmetry of mouth and eyes    Post menopausal)  intravenous induction     Anesthetic plan, risks, benefits, and alternatives have been provided, discussed and informed consent has been obtained with: patient.    Use of blood products discussed with patient .   Plan discussed with CRNA.        CODE STATUS:

## 2022-09-21 NOTE — ANESTHESIA POSTPROCEDURE EVALUATION
Patient: Noemi Chang    Procedure Summary     Date: 09/21/22 Room / Location: [facility] OR 07 / [facility] MAIN OR    Anesthesia Start: 1332 Anesthesia Stop: 1413    Procedure: Cystoscopy with right ureteroscopy with laser and right ureteral stent placement (Right ) Diagnosis:       Ureteral stone      (Ureteral stone [N20.1])    Surgeons: Tio Barrera MD Provider: Vikram Earl MD    Anesthesia Type: general ASA Status: 2          Anesthesia Type: general    Vitals  Vitals Value Taken Time   /93 09/21/22 1522   Temp 36.7 °C (98.1 °F) 09/21/22 1410   Pulse 85 09/21/22 1525   Resp 16 09/21/22 1510   SpO2 92 % 09/21/22 1525   Vitals shown include unvalidated device data.        Post Anesthesia Care and Evaluation    Patient location during evaluation: bedside  Patient participation: complete - patient participated  Level of consciousness: awake  Pain management: adequate    Airway patency: patent  Anesthetic complications: No anesthetic complications  PONV Status: none  Cardiovascular status: acceptable and stable  Respiratory status: acceptable  Hydration status: acceptable    Comments: An Anesthesiologist personally participated in the most demanding procedures (including induction and emergence if applicable) in the anesthesia plan, monitored the course of anesthesia administration at frequent intervals and remained physically present and available for immediate diagnosis and treatment of emergencies.

## 2022-09-22 LAB
LAB AP CASE REPORT: NORMAL
LAB AP CLINICAL INFORMATION: NORMAL
PATH REPORT.FINAL DX SPEC: NORMAL
PATH REPORT.GROSS SPEC: NORMAL

## 2022-09-27 ENCOUNTER — TELEPHONE (OUTPATIENT)
Dept: SURGERY | Facility: CLINIC | Age: 52
End: 2022-09-27

## 2022-09-27 DIAGNOSIS — N20.0 NEPHROLITH: Primary | ICD-10-CM

## 2022-09-27 RX ORDER — HYDROCODONE BITARTRATE AND ACETAMINOPHEN 5; 325 MG/1; MG/1
1 TABLET ORAL EVERY 6 HOURS PRN
Qty: 12 TABLET | Refills: 0 | Status: SHIPPED | OUTPATIENT
Start: 2022-09-27 | End: 2023-03-13

## 2022-09-27 NOTE — TELEPHONE ENCOUNTER
Spoke with patient, made her aware to begin weaning off pain medication replacing it with tylenol/ibuprofen as needed. She voiced understanding. She is also aware she should keep appt on the 21st to follow up from her AL. She voiced understanding.

## 2022-09-27 NOTE — TELEPHONE ENCOUNTER
Pt called requesting a refill of pain meds. She pulled her stent Sunday and is still experiencing flank pain off and on. There was no pain until she pulled her stent on Sunday. No fever and is able to urinate with no problem. Pt wants to know if she needs to be seen before her 10-21 appt? That appt was made previous to her surgery on Wednesday. Pharmacy is [facility] on [facility].   # [phone]

## 2022-09-28 LAB
CALCIUM OXALATE DIHYDRATE MFR STONE IR: 10 %
COLOR STONE: NORMAL
COM MFR STONE: 90 %
COMPN STONE: NORMAL
LABORATORY COMMENT REPORT: NORMAL
Lab: NORMAL
Lab: NORMAL
PHOTO: NORMAL
SIZE STONE: NORMAL MM
SPEC SOURCE SUBJ: NORMAL
WT STONE: 16 MG

## 2022-10-17 ENCOUNTER — HOSPITAL ENCOUNTER (OUTPATIENT)
Dept: ULTRASOUND IMAGING | Facility: HOSPITAL | Age: 52
Discharge: HOME | End: 2022-10-17
Admitting: UROLOGY

## 2022-10-17 DIAGNOSIS — N20.0 NEPHROLITHIASIS: ICD-10-CM

## 2022-10-17 PROCEDURE — 76775 US EXAM ABDO BACK WALL LIM: CPT

## 2022-10-19 ENCOUNTER — TELEPHONE (OUTPATIENT)
Dept: UROLOGY | Facility: CLINIC | Age: 52
End: 2022-10-19

## 2022-10-20 ENCOUNTER — TELEPHONE (OUTPATIENT)
Dept: UROLOGY | Facility: CLINIC | Age: 52
End: 2022-10-20

## 2022-10-20 NOTE — TELEPHONE ENCOUNTER
SPOKE TO PT TO TRY TO R/S CX APPT FROM 10/21 WITH DR. CHAPMAN AND SHE STATED SHE WAS CALLED AND TOLD SHE DIDN'T NEED TO COME IN/ANYTHING ELSE TO DO?

## 2022-11-17 ENCOUNTER — TELEPHONE (OUTPATIENT)
Dept: GASTROENTEROLOGY | Facility: CLINIC | Age: 52
End: 2022-11-17

## 2022-11-17 NOTE — TELEPHONE ENCOUNTER
Hub staff attempted to follow warm transfer process and was unsuccessful     Caller: Noemi Chang    Relationship to patient: Self    Best call back number: [phone]    Patient is needing: PATIENT WOULD LIKE TO SCHEDULE PROCEDURE. WOULD LIKE FOR SOMEONE IN OFFICE TO CALL AND ASSIST HER WITH SCHEDULING.

## 2022-11-23 ENCOUNTER — PRE-PROCEDURE SCREENING (OUTPATIENT)
Dept: GASTROENTEROLOGY | Facility: CLINIC | Age: 52
End: 2022-11-23

## 2022-11-23 NOTE — TELEPHONE ENCOUNTER
Mailed OA questionnaire to patient to complete for screening. Will defer for 30days and notify referring physician if not received

## 2022-12-08 ENCOUNTER — TELEPHONE (OUTPATIENT)
Dept: GASTROENTEROLOGY | Facility: CLINIC | Age: 52
End: 2022-12-08

## 2022-12-08 DIAGNOSIS — K90.0 CELIAC DISEASE: ICD-10-CM

## 2022-12-08 DIAGNOSIS — Z12.11 COLON CANCER SCREENING: Primary | ICD-10-CM

## 2022-12-08 DIAGNOSIS — K76.0 HEPATIC STEATOSIS: ICD-10-CM

## 2022-12-08 DIAGNOSIS — R93.2 ABNORMAL CT OF LIVER: Primary | ICD-10-CM

## 2022-12-08 RX ORDER — SODIUM CHLORIDE, SODIUM LACTATE, POTASSIUM CHLORIDE, CALCIUM CHLORIDE 600; 310; 30; 20 MG/100ML; MG/100ML; MG/100ML; MG/100ML
30 INJECTION, SOLUTION INTRAVENOUS CONTINUOUS
Status: CANCELLED | OUTPATIENT
Start: 2023-03-07

## 2022-12-08 NOTE — TELEPHONE ENCOUNTER
----- Message from Noemi Chang sent at 12/7/2022  2:00 PM EST -----  Regarding: Follow up mri for my liver and colonoscopy can I schedule these before the end of the year?   Contact: [phone]  Need to schedule my mri before my next visit to check my liver and my colonoscopy and endoscopy I would like ke to schedule them as soon as possible.

## 2022-12-08 NOTE — TELEPHONE ENCOUNTER
Orders placed for all procedures.  Due to late request, I cannot guarantee that she will be able to get them scheduled before the end of the year.

## 2022-12-09 NOTE — TELEPHONE ENCOUNTER
Called pt and advised of Dr Muhammad note. Also advised she can call [phone] to arrange mri. Verb understanding.

## 2022-12-14 ENCOUNTER — TELEPHONE (OUTPATIENT)
Dept: GASTROENTEROLOGY | Facility: CLINIC | Age: 52
End: 2022-12-14

## 2022-12-21 ENCOUNTER — HOSPITAL ENCOUNTER (OUTPATIENT)
Dept: MRI IMAGING | Facility: HOSPITAL | Age: 52
Discharge: HOME | End: 2022-12-21
Admitting: INTERNAL MEDICINE

## 2022-12-21 DIAGNOSIS — K76.0 HEPATIC STEATOSIS: ICD-10-CM

## 2022-12-21 DIAGNOSIS — R93.2 ABNORMAL CT OF LIVER: ICD-10-CM

## 2022-12-21 PROCEDURE — 74183 MRI ABD W/O CNTR FLWD CNTR: CPT

## 2022-12-21 PROCEDURE — 0 GADOBENATE DIMEGLUMINE 529 MG/ML SOLUTION: Performed by: INTERNAL MEDICINE

## 2022-12-21 PROCEDURE — A9577 INJ MULTIHANCE: HCPCS | Performed by: INTERNAL MEDICINE

## 2022-12-21 RX ADMIN — GADOBENATE DIMEGLUMINE 15 ML: 529 INJECTION, SOLUTION INTRAVENOUS at 11:47

## 2022-12-22 ENCOUNTER — TELEPHONE (OUTPATIENT)
Dept: GASTROENTEROLOGY | Facility: CLINIC | Age: 52
End: 2022-12-22

## 2022-12-22 NOTE — TELEPHONE ENCOUNTER
----- Message from Ana Muhammad MD sent at 12/22/2022 12:30 PM EST -----  MRI shows diffuse hepatic steatosis with a focal fatty sparing.  No liver lesion. Small left kidney benign cyst

## 2022-12-22 NOTE — PROGRESS NOTES
MRI shows diffuse hepatic steatosis with a focal fatty sparing.  No liver lesion. Small left kidney benign cyst

## 2023-01-03 ENCOUNTER — TELEPHONE (OUTPATIENT)
Dept: GASTROENTEROLOGY | Facility: CLINIC | Age: 53
End: 2023-01-03

## 2023-01-03 NOTE — TELEPHONE ENCOUNTER
Caller: Noemi Chang    Relationship to patient: Self    Best call back number: [phone] ANYTIME CONTACT  Patient is needing: PT HAS FURTHER QUESTIONS ABOUT THE MRI RESULTS. PT ALSO WANTS TO SCHEDULE COLONOSCOPY. PLEASE

## 2023-01-04 NOTE — TELEPHONE ENCOUNTER
Call to pt.  MRI results of 12/22 reviewed.  Verb understanding.     See case request of 12/8.  Message to Scheduling.

## 2023-01-06 ENCOUNTER — TELEPHONE (OUTPATIENT)
Dept: GASTROENTEROLOGY | Facility: CLINIC | Age: 53
End: 2023-01-06
Payer: COMMERCIAL

## 2023-01-06 NOTE — TELEPHONE ENCOUNTER
WOJCIECH Head for colonoscopy on 3/7/23  arrive at  930am  . Mailed Prep instructions to Mailing address on-file. ----miralax

## 2023-02-28 ENCOUNTER — TELEPHONE (OUTPATIENT)
Dept: GASTROENTEROLOGY | Facility: CLINIC | Age: 53
End: 2023-02-28

## 2023-02-28 NOTE — TELEPHONE ENCOUNTER
Caller: Noemi Chang    Relationship to patient: Self    Best call back number: [phone]    Patient is needing:PT NEEDS TO CANCEL HER COLONOSCOPY SCHEDULED FOR 3/7/23. WILL CALL BACK TO R/S.

## 2023-03-13 PROCEDURE — U0004 COV-19 TEST NON-CDC HGH THRU: HCPCS | Performed by: NURSE PRACTITIONER

## 2023-03-14 ENCOUNTER — TELEPHONE (OUTPATIENT)
Dept: URGENT CARE | Facility: CLINIC | Age: 53
End: 2023-03-14
Payer: COMMERCIAL

## 2023-03-14 NOTE — TELEPHONE ENCOUNTER
----- Message from MERLE Hood sent at 3/14/2023  9:36 AM EDT -----  Please call the patient regarding her normal result.    Please inform patient that her PCR COVID-19 test results are not detected which means negative.    If patient continues to have symptoms or other concerns persist she should follow-up with her primary care provider which is listed as Dr. Ana Brown.

## 2023-03-15 ENCOUNTER — TELEPHONE (OUTPATIENT)
Dept: URGENT CARE | Facility: CLINIC | Age: 53
End: 2023-03-15
Payer: COMMERCIAL

## 2023-03-15 NOTE — TELEPHONE ENCOUNTER
LVM for patient to call back regarding appt Friday. This appt is not needed due to completely negative AL. As long as patient is doing okay, we can cancel this appt and she can follow up on an as needed basis. If she does want to see Bruce, we will need to move the appt out   
Pt called back and was given the message. She states that she is doing fine and does not need to be seen. Please cancel the appt for her and she will just f/u if needed.   
.

## 2023-06-08 ENCOUNTER — OFFICE VISIT (OUTPATIENT)
Dept: INTERNAL MEDICINE | Age: 53
End: 2023-06-08
Payer: COMMERCIAL

## 2023-06-08 VITALS
HEART RATE: 97 BPM | BODY MASS INDEX: 30.02 KG/M2 | DIASTOLIC BLOOD PRESSURE: 88 MMHG | HEIGHT: 65 IN | SYSTOLIC BLOOD PRESSURE: 142 MMHG | WEIGHT: 180.2 LBS | TEMPERATURE: 98.7 F | OXYGEN SATURATION: 97 %

## 2023-06-08 DIAGNOSIS — R73.03 PREDIABETES: Primary | ICD-10-CM

## 2023-06-08 DIAGNOSIS — B02.21 RAMSAY HUNT SYNDROME (GENICULATE HERPES ZOSTER): ICD-10-CM

## 2023-06-08 DIAGNOSIS — K90.0 CELIAC DISEASE: ICD-10-CM

## 2023-06-08 DIAGNOSIS — F90.9 ADULT ADHD (ATTENTION DEFICIT HYPERACTIVITY DISORDER): ICD-10-CM

## 2023-06-08 DIAGNOSIS — E78.2 MIXED HYPERLIPIDEMIA: ICD-10-CM

## 2023-06-08 PROBLEM — J45.909 ASTHMA: Status: ACTIVE | Noted: 2023-06-08

## 2023-06-08 PROBLEM — K80.20 CHOLELITHIASIS: Status: ACTIVE | Noted: 2017-04-05

## 2023-06-08 RX ORDER — ESTRADIOL 0.05 MG/D
PATCH, EXTENDED RELEASE TRANSDERMAL
COMMUNITY
Start: 2023-06-01

## 2023-06-08 NOTE — PROGRESS NOTES
I N T E R N A L  M E D I C I N E  Niki DexterMERLE    ENCOUNTER DATE:  06/08/2023    Noemi Chang / 52 y.o. / female      CHIEF COMPLAINT / REASON FOR OFFICE VISIT     Establish Care (Would like to discuss weight loss meds)      HISTORY OF PRESENT ILLNESS     52 year old female being seen today to establish care with New PCP. 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     Patient seen today for concern for prediabetes and wanting to loose weight. Patient most recent A1C 6.2. Patient wanting to start Ozempic for diabetes control and weight loss. Patient has done juice fast, Weight Watchers without success. Patient states that she has always limited her sugars and carbs in her diet and states exercises at least 30 minutes 3 days a week and is unable to loose weight.   Patient with right facial droop related to [place] Hunt Syndrome and is managed by [facility].   Patient states is followed by OB/GYN and continues on estradiol for hot flashes and progesterone as well.   Patient states followed by a Psychiatrist who prescribes and manages her ADHD and Anxiety and feels that she is doing better.   Health Maintenance: Patient due for her Colonoscopy and Pneumonia vaccination.         MEDICATIONS AT THE TIME OF OFFICE VISIT     Current Outpatient Medications on File Prior to Visit   Medication Sig Dispense Refill    atomoxetine (STRATTERA) 40 MG capsule Take 1 capsule by mouth 2 (Two) Times a Day.      DULoxetine (CYMBALTA) 20 MG capsule TAKE 1 CAPSULE BY MOUTH EVERY MORNING AND AT NIGHT      estradiol (MINIVELLE, VIVELLE-DOT) 0.05 MG/24HR patch APPLY 1 PATCH TOPICALLY TO THE SKIN 2 TIMES A WEEK      hydrOXYzine (ATARAX) 10 MG tablet TAKE 1 TABLET BY MOUTH TWICE DAILY AS NEEDED FOR SLEEP OR ANXIETY      Progesterone (PROMETRIUM) 200 MG capsule Take 1 capsule by  mouth Daily.      [DISCONTINUED] ondansetron (ZOFRAN) 8 MG tablet Take 1 tablet by mouth Every 8 (Eight) Hours As Needed for Nausea or Vomiting. (Patient not taking: Reported on 6/8/2023) 15 tablet 0     No current facility-administered medications on file prior to visit.          Patient Care Team:  Niki Dexter APRN as PCP - General (Family Medicine)  Aubrie Dunaway MD as Consulting Physician (Obstetrics and Gynecology)  Markus Vergara Jr., MD as Consulting Physician (Urology)  Tio Barrera MD as Consulting Physician (Urology)    REVIEW OF SYSTEMS     Review of Systems   Constitutional:  Positive for unexpected weight change. Negative for activity change, appetite change, chills and fever.   HENT: Negative.     Eyes:  Negative for visual disturbance.   Respiratory:  Negative for cough, chest tightness and shortness of breath.    Cardiovascular:  Negative for chest pain and palpitations.   Gastrointestinal:  Negative for constipation, diarrhea, nausea and vomiting.   Endocrine: Negative for polydipsia, polyphagia and polyuria.   Genitourinary:  Negative for difficulty urinating and dysuria.   Musculoskeletal:  Negative for arthralgias.   Neurological:  Positive for numbness. Negative for dizziness, tremors, syncope and headaches.   Psychiatric/Behavioral: Negative.          PHYSICAL EXAMINATION     Physical Exam  Constitutional:       General: She is not in acute distress.     Appearance: She is obese.   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      Pulses: Normal pulses.      Heart sounds: Normal heart sounds.   Pulmonary:      Effort: Pulmonary effort is normal. No respiratory distress.      Breath sounds: Normal breath sounds.   Abdominal:      General: Bowel sounds are normal. There is no distension.      Palpations: Abdomen is soft.       "Tenderness: There is no abdominal tenderness.   Musculoskeletal:         General: Normal range of motion.   Skin:     General: Skin is warm and dry.   Neurological:      Mental Status: She is alert and oriented to person, place, and time. Mental status is at baseline.   Psychiatric:         Mood and Affect: Mood normal.         Behavior: Behavior normal.         Thought Content: Thought content normal.         Judgment: Judgment normal.         VITAL SIGNS     Visit Vitals  /88 (BP Location: Left arm, Patient Position: Sitting, Cuff Size: Adult)   Pulse 97   Temp 98.7 °F (37.1 °C) (Temporal)   Ht 165.1 cm (65\")   Wt 81.7 kg (180 lb 3.2 oz)   SpO2 97%   BMI 29.99 kg/m²           @BP@  Wt Readings from Last 3 Encounters:   06/08/23 81.7 kg (180 lb 3.2 oz)   03/13/23 79.4 kg (175 lb)   12/21/22 78 kg (172 lb)     Body mass index is 29.99 kg/m².          REVIEWED DATA     Labs:   Lab Results   Component Value Date     06/29/2022    K 3.8 06/29/2022    CALCIUM 9.6 06/29/2022    AST 15 06/29/2022    ALT 22 06/29/2022    BUN 15 06/29/2022    CREATININE 0.76 06/29/2022    CREATININE 0.69 01/06/2022    CREATININE 0.70 01/04/2022    EGFRIFNONA 100 01/06/2022    EGFRIFAFRI 115 01/06/2022       No results found for: HGBA1C    Lab Results   Component Value Date     (H) 01/09/2020    HDL 45 (L) 01/09/2020    TRIG 148 01/09/2020       No results found for: TSH, FREET4    Lab Results   Component Value Date    WBC 6.40 06/29/2022    HGB 14.9 06/29/2022     06/29/2022       No results found for: MALBCRERATIO        Imaging:           Medical Tests:           Summary of old records / correspondence / consultant report:           Request outside records:       ASSESSMENT & PLAN     Diagnoses and all orders for this visit:    1. Prediabetes (Primary)  Assessment & Plan:  Patient with an A1C of 6.2 and glucose of 115 from recent labs on 1/28/23.  Patient wanting to start Ozempic for diabetes control and weight " "loss. Patient has done juice fast, Weight Watchers without success.     Patient has weight issues and is wanting to use medications to supplement nutrition and activity lifestyle changes. Patient was counseled that weight-loss drugs aren't an easy answer to weight loss. But they may help make the lifestyle changes that needed to practice to lose weight and improve health. We discussed that while few medications are approved for weight loss (including Contrave, Saxenda, Xenical, Hitesh, Qsymia, Wegovy, and Imcivree) many of the medications used to treat diabetes have shown weight loss as a side-effect and can be used as \"off-label\" treatment for obesity. Patient was informed that this has not been approved and we need to monitor them very closely for side effects and complications, as well as for effectiveness. Patient is aware of the risk of thyroid cancer associated with some of these medications.   Ozempic (Samaglutide) is a medication for diabetes to be used with diet and exercise for control of blood sugar. Patient is aware that it has several side effects that we need to watch for that can be serious including pancreatitis, thyroid cancer, kidney disease, and eye disease (retinopathy). Common side effects include nausea, diarrhea or constipation, heartburn, and low blood sugar. Patient is aware of need for monitoring A1c (three-month average blood sugar) as well as kidney and liver function.         2. Celiac disease  Assessment & Plan:  Hx of Celiac Disease       3. 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 Adult ADHD (attention deficit hyperactivity disorder)  Assessment & Plan:  Strattera, Hydroxyzine, and Duloxetine prescribed and managed by Psychiatry       5. Mixed hyperlipidemia  Assessment & Plan:  Most recent labs 1/28/23 T Chol 251, ,   Encouraged Omega 3 Fish oil diet exercise     Initiate lifestyle " modifications for high cholesterol and increased weight. Decrease/eliminate soda, caffeine, alcohol and overall caloric intake. Reduce carbohydrates and sweets in diet.  Continue to improve dietary habits with lean proteins, fresh vegetables, fruits, and nuts. Improve aerobic exercise: walking/biking/swimming daily as tolerated, recommend 30 minutes/day at least        Other orders  -     Semaglutide,0.25 or 0.5MG/DOS, (OZEMPIC) 2 MG/1.5ML solution pen-injector; Inject 0.25 mg under the skin into the appropriate area as directed 1 (One) Time Per Week.  Dispense: 1.5 mL; Refill: 4         Return in about 3 months (around 9/8/2023) for Annual physical.      Followed and Managed by Psychiatry for ADHD, MERLE Macias for Anxiety with Panic attacks   Followed by [facility], Dr Dunaway   Followed by [facility] and Dx with MERLE Bajwa

## 2023-06-09 ENCOUNTER — TELEPHONE (OUTPATIENT)
Dept: INTERNAL MEDICINE | Age: 53
End: 2023-06-09

## 2023-06-09 NOTE — ASSESSMENT & PLAN NOTE
"Patient with an A1C of 6.2 and glucose of 115 from recent labs on 1/28/23.    Patient has weight issues and is wanting to use medications to supplement nutrition and activity lifestyle changes. Patient was counseled that weight-loss drugs aren't an easy answer to weight loss. But they may help make the lifestyle changes that needed to practice to lose weight and improve health. We discussed that while few medications are approved for weight loss (including Contrave, Saxenda, Xenical, Hitesh, Qsymia, Wegovy, and Imcivree) many of the medications used to treat diabetes have shown weight loss as a side-effect and can be used as \"off-label\" treatment for obesity. Patient was informed that this has not been approved and we need to monitor them very closely for side effects and complications, as well as for effectiveness. Patient is aware of the risk of thyroid cancer associated with some of these medications.   Ozempic (Samaglutide) is a medication for diabetes to be used with diet and exercise for control of blood sugar. Patient is aware that it has several side effects that we need to watch for that can be serious including pancreatitis, thyroid cancer, kidney disease, and eye disease (retinopathy). Common side effects include nausea, diarrhea or constipation, heartburn, and low blood sugar. Patient is aware of need for monitoring A1c (three-month average blood sugar) as well as kidney and liver function.     "

## 2023-06-09 NOTE — ASSESSMENT & PLAN NOTE
Most recent labs 1/28/23 T Chol 251, ,   Encouraged Omega 3 Fish oil diet exercise     Initiate lifestyle modifications for high cholesterol and increased weight. Decrease/eliminate soda, caffeine, alcohol and overall caloric intake. Reduce carbohydrates and sweets in diet.  Continue to improve dietary habits with lean proteins, fresh vegetables, fruits, and nuts. Improve aerobic exercise: walking/biking/swimming daily as tolerated, recommend 30 minutes/day at least

## 2023-06-09 NOTE — TELEPHONE ENCOUNTER
Caller: BLUECROSS BLUE AAKASH    Best call back number: [phone]     Who are you requesting to speak with (clinical staff, provider,  specific staff member): CLINICAL STAFF     What was the call regarding: NEEDING A PA FOR Semaglutide,0.25 or 0.5MG/DOS, (OZEMPIC) 2 MG/1.5ML solution pen-injector

## 2023-06-15 DIAGNOSIS — R73.03 PREDIABETES: ICD-10-CM

## 2023-06-15 DIAGNOSIS — K90.0 CELIAC DISEASE: ICD-10-CM

## 2023-06-15 DIAGNOSIS — E78.2 MIXED HYPERLIPIDEMIA: Primary | ICD-10-CM

## 2023-06-27 ENCOUNTER — TELEPHONE (OUTPATIENT)
Dept: INTERNAL MEDICINE | Age: 53
End: 2023-06-27

## 2023-06-27 NOTE — TELEPHONE ENCOUNTER
Caller: JOEY POLK    Best call back number: [phone]   Who are you requesting to speak with (clinical staff, provider,  specific staff member): CLINICAL STAFF     What was the call regarding: CALLING ABOUT A QUESTION ON APPEAL FOR PA FOR OZEMPIC

## 2023-10-09 PROBLEM — Z12.11 COLON CANCER SCREENING: Status: ACTIVE | Noted: 2022-12-08

## 2023-11-26 VITALS
DIASTOLIC BLOOD PRESSURE: 77 MMHG | TEMPERATURE: 97.6 F | HEIGHT: 65 IN | OXYGEN SATURATION: 100 % | SYSTOLIC BLOOD PRESSURE: 126 MMHG | WEIGHT: 170.64 LBS | HEART RATE: 86 BPM | RESPIRATION RATE: 20 BRPM | BODY MASS INDEX: 28.43 KG/M2

## 2023-11-26 PROCEDURE — 81001 URINALYSIS AUTO W/SCOPE: CPT

## 2023-11-26 PROCEDURE — 99283 EMERGENCY DEPT VISIT LOW MDM: CPT

## 2023-11-26 RX ORDER — SODIUM CHLORIDE 0.9 % (FLUSH) 0.9 %
10 SYRINGE (ML) INJECTION AS NEEDED
Status: DISCONTINUED | OUTPATIENT
Start: 2023-11-26 | End: 2023-11-27 | Stop reason: HOSPADM

## 2023-11-27 ENCOUNTER — APPOINTMENT (OUTPATIENT)
Dept: CT IMAGING | Facility: HOSPITAL | Age: 53
End: 2023-11-27
Payer: COMMERCIAL

## 2023-11-27 ENCOUNTER — HOSPITAL ENCOUNTER (EMERGENCY)
Facility: HOSPITAL | Age: 53
Discharge: LEFT BEFORE DISCHARGE | End: 2023-11-27
Admitting: EMERGENCY MEDICINE
Payer: COMMERCIAL

## 2023-11-27 DIAGNOSIS — Z53.21 ELOPED FROM EMERGENCY DEPARTMENT: Primary | ICD-10-CM

## 2023-11-27 LAB
ALBUMIN SERPL-MCNC: 4.2 G/DL (ref 3.5–5.2)
ALBUMIN/GLOB SERPL: 1.7 G/DL
ALP SERPL-CCNC: 105 U/L (ref 39–117)
ALT SERPL W P-5'-P-CCNC: 18 U/L (ref 1–33)
ANION GAP SERPL CALCULATED.3IONS-SCNC: 16.5 MMOL/L (ref 5–15)
AST SERPL-CCNC: 11 U/L (ref 1–32)
BACTERIA UR QL AUTO: ABNORMAL /HPF
BASOPHILS # BLD AUTO: 0.02 10*3/MM3 (ref 0–0.2)
BASOPHILS NFR BLD AUTO: 0.2 % (ref 0–1.5)
BILIRUB SERPL-MCNC: 0.2 MG/DL (ref 0–1.2)
BILIRUB UR QL STRIP: NEGATIVE
BUN SERPL-MCNC: 14 MG/DL (ref 6–20)
BUN/CREAT SERPL: 18.2 (ref 7–25)
CALCIUM SPEC-SCNC: 9.5 MG/DL (ref 8.6–10.5)
CHLORIDE SERPL-SCNC: 100 MMOL/L (ref 98–107)
CLARITY UR: ABNORMAL
CO2 SERPL-SCNC: 22.5 MMOL/L (ref 22–29)
COLOR UR: YELLOW
CREAT SERPL-MCNC: 0.77 MG/DL (ref 0.57–1)
D-LACTATE SERPL-SCNC: 3.7 MMOL/L (ref 0.5–2)
DEPRECATED RDW RBC AUTO: 36.4 FL (ref 37–54)
EGFRCR SERPLBLD CKD-EPI 2021: 91.2 ML/MIN/1.73
EOSINOPHIL # BLD AUTO: 0.64 10*3/MM3 (ref 0–0.4)
EOSINOPHIL NFR BLD AUTO: 7.9 % (ref 0.3–6.2)
ERYTHROCYTE [DISTWIDTH] IN BLOOD BY AUTOMATED COUNT: 11.4 % (ref 12.3–15.4)
GLOBULIN UR ELPH-MCNC: 2.5 GM/DL
GLUCOSE SERPL-MCNC: 200 MG/DL (ref 65–99)
GLUCOSE UR STRIP-MCNC: NEGATIVE MG/DL
HCT VFR BLD AUTO: 44.4 % (ref 34–46.6)
HGB BLD-MCNC: 15.2 G/DL (ref 12–15.9)
HGB UR QL STRIP.AUTO: NEGATIVE
HOLD SPECIMEN: NORMAL
HOLD SPECIMEN: NORMAL
HYALINE CASTS UR QL AUTO: ABNORMAL /LPF
IMM GRANULOCYTES # BLD AUTO: 0.06 10*3/MM3 (ref 0–0.05)
IMM GRANULOCYTES NFR BLD AUTO: 0.7 % (ref 0–0.5)
KETONES UR QL STRIP: ABNORMAL
LEUKOCYTE ESTERASE UR QL STRIP.AUTO: ABNORMAL
LIPASE SERPL-CCNC: 43 U/L (ref 13–60)
LYMPHOCYTES # BLD AUTO: 3.58 10*3/MM3 (ref 0.7–3.1)
LYMPHOCYTES NFR BLD AUTO: 44.1 % (ref 19.6–45.3)
MCH RBC QN AUTO: 29.9 PG (ref 26.6–33)
MCHC RBC AUTO-ENTMCNC: 34.2 G/DL (ref 31.5–35.7)
MCV RBC AUTO: 87.2 FL (ref 79–97)
MONOCYTES # BLD AUTO: 0.58 10*3/MM3 (ref 0.1–0.9)
MONOCYTES NFR BLD AUTO: 7.1 % (ref 5–12)
NEUTROPHILS NFR BLD AUTO: 3.24 10*3/MM3 (ref 1.7–7)
NEUTROPHILS NFR BLD AUTO: 40 % (ref 42.7–76)
NITRITE UR QL STRIP: NEGATIVE
NRBC BLD AUTO-RTO: 0 /100 WBC (ref 0–0.2)
PH UR STRIP.AUTO: >=9 [PH] (ref 5–8)
PLATELET # BLD AUTO: 281 10*3/MM3 (ref 140–450)
PMV BLD AUTO: 9.9 FL (ref 6–12)
POTASSIUM SERPL-SCNC: 3.9 MMOL/L (ref 3.5–5.2)
PROT SERPL-MCNC: 6.7 G/DL (ref 6–8.5)
PROT UR QL STRIP: ABNORMAL
RBC # BLD AUTO: 5.09 10*6/MM3 (ref 3.77–5.28)
RBC # UR STRIP: ABNORMAL /HPF
REF LAB TEST METHOD: ABNORMAL
SODIUM SERPL-SCNC: 139 MMOL/L (ref 136–145)
SP GR UR STRIP: 1.02 (ref 1–1.03)
SQUAMOUS #/AREA URNS HPF: ABNORMAL /HPF
UROBILINOGEN UR QL STRIP: ABNORMAL
WBC # UR STRIP: ABNORMAL /HPF
WBC NRBC COR # BLD AUTO: 8.12 10*3/MM3 (ref 3.4–10.8)
WHOLE BLOOD HOLD COAG: NORMAL
WHOLE BLOOD HOLD SPECIMEN: NORMAL

## 2023-11-27 PROCEDURE — 36415 COLL VENOUS BLD VENIPUNCTURE: CPT

## 2023-11-27 PROCEDURE — 83690 ASSAY OF LIPASE: CPT

## 2023-11-27 PROCEDURE — 83605 ASSAY OF LACTIC ACID: CPT

## 2023-11-27 PROCEDURE — 80053 COMPREHEN METABOLIC PANEL: CPT

## 2023-11-27 PROCEDURE — 85025 COMPLETE CBC W/AUTO DIFF WBC: CPT

## 2023-11-27 NOTE — ED PROVIDER NOTES
Time: 11:55 PM EST  Date of encounter:  2023  Independent Historian/Clinical History and Information was obtained by:   Patient    History is limited by: N/A    Chief Complaint   Patient presents with    Abdominal Pain    Diarrhea         History of Present Illness:  Patient is a 53 y.o. year old female who presents to the emergency department for evaluation of abdominal pain and diarrhea. Has had diarrhea x 4 days. Taken immodium without relief. No with severe epigastric abdominal pain. Has had nausea but no vomiting. No recent antibiotics. Abdominal surgical hx of cholecystectomy and  section. On semaglutide but has been on since July and no recent dose changes, tolerating well. Denies URI symptoms, dysuria, hematuria. (Remigio Jenkins PA-C provider in triage 11:57 PM EST )     Patient Care Team  Primary Care Provider: Provider, No Known    Past Medical History:     Allergies   Allergen Reactions    Adhesive Tape Rash     Irritation       Past Medical History:   Diagnosis Date    ADHD (attention deficit hyperactivity disorder)     Anxiety     Bell's palsy     LEFT EYE IS UNABLE TO CLOSE ALL THE WAY    Celiac disease     CKD (chronic kidney disease)     STAGE 1 - ASYMPTOMATIC    Fatty liver     GERD (gastroesophageal reflux disease)     Hyperlipidemia     Kidney stones     FOLLOWED BY DR. STEPHEN BEAR    Obesity     Renal insufficiency     SAB (spontaneous )      A2     Past Surgical History:   Procedure Laterality Date     SECTION N/A      SECTION N/A     CHOLECYSTECTOMY N/A     COLONOSCOPY N/A     NO RECORDS, PER PT WNL, (+) CELIAC DISEASE, DR. WOOD [place]    CYSTOSCOPY URETEROSCOPY Right 2022    Procedure: Cystoscopy with right ureteroscopy with laser and right ureteral stent placement;  Surgeon: Tio Barrera MD;  [facility];  Service: Urology;  Laterality: Right;    ENDOSCOPY N/A     EXTRACORPOREAL SHOCK WAVE  LITHOTRIPSY (ESWL) N/A 09/12/2018    DR. STEPHEN BEAR AT [place]    KNEE ARTHROSCOPY W/ MENISCAL REPAIR Left 1995    SINUS SURGERY N/A     TONSILLECTOMY AND ADENOIDECTOMY Bilateral      Family History   Adopted: Yes   Problem Relation Age of Onset    [place] Hyperthermia Neg Hx        Home Medications:  Prior to Admission medications    Medication Sig Start Date End Date Taking? Authorizing Provider   atomoxetine (STRATTERA) 40 MG capsule Take 1 capsule by mouth 2 (Two) Times a Day. 9/5/22   Cyril Radford MD   DULoxetine (CYMBALTA) 20 MG capsule TAKE 1 CAPSULE BY MOUTH EVERY MORNING AND AT NIGHT 1/3/23   Cyril Radford MD   estradiol (MINIVELLE, VIVELLE-DOT) 0.05 MG/24HR patch APPLY 1 PATCH TOPICALLY TO THE SKIN 2 TIMES A WEEK 6/1/23   Cyril Radford MD   hydrOXYzine (ATARAX) 10 MG tablet TAKE 1 TABLET BY MOUTH TWICE DAILY AS NEEDED FOR SLEEP OR ANXIETY 1/3/23   Cyril Radford MD   Progesterone (PROMETRIUM) 200 MG capsule Take 1 capsule by mouth Daily. 12/26/22   Cyril Radford MD   Qelbree 200 MG capsule sustained-release 24 hr TAKE 1 CAPSULE BY MOUTH EACH DAY 6/19/23   Cyril Radford MD   Semaglutide-Weight Management 0.25 MG/0.5ML solution auto-injector Inject 0.25 mg under the skin into the appropriate area as directed 1 (One) Time Per Week. 6/30/23   Niki Dexter APRN        Social History:   Social History     Tobacco Use    Smoking status: Never    Smokeless tobacco: Never   Vaping Use    Vaping Use: Never used   Substance Use Topics    Alcohol use: Yes     Alcohol/week: 3.0 standard drinks of alcohol     Types: 1 Glasses of wine, 2 Drinks containing 0.5 oz of alcohol per week     Comment: RARELY    Drug use: Never         Review of Systems:  Review of Systems   Gastrointestinal:  Positive for abdominal pain, diarrhea and nausea. Negative for vomiting.        Physical Exam:  /77 (Patient Position: Sitting)   Pulse 86   Temp 97.6 °F (36.4 °C) (Oral)   Resp  "20   Ht 165.1 cm (65\")   Wt 77.4 kg (170 lb 10.2 oz)   SpO2 100%   BMI 28.40 kg/m²         Physical Exam  HENT:      Head: Normocephalic.      Mouth/Throat:      Mouth: Mucous membranes are moist.   Eyes:      Pupils: Pupils are equal, round, and reactive to light.   Pulmonary:      Effort: Pulmonary effort is normal.   Abdominal:      General: There is no distension.      Tenderness: There is abdominal tenderness in the epigastric area. There is guarding. Negative signs include Murillo's sign, Rovsing's sign and McBurney's sign.   Musculoskeletal:      Cervical back: Neck supple.   Skin:     General: Skin is warm and dry.   Neurological:      General: No focal deficit present.      Mental Status: She is alert and oriented to person, place, and time.   Psychiatric:         Mood and Affect: Mood normal.         Behavior: Behavior normal.                      Procedures:  Procedures      Medical Decision Making:      Comorbidities that affect care:        External Notes reviewed:          The following orders were placed and all results were independently analyzed by me:  Orders Placed This Encounter   Procedures    [place] Draw    Comprehensive Metabolic Panel    Lipase    Urinalysis With Microscopic If Indicated (No Culture) - Urine, Clean Catch    Lactic Acid, Plasma    CBC Auto Differential    Urinalysis, Microscopic Only - Urine, Clean Catch    CBC & Differential    Green Top (Gel)    Lavender Top    Gold Top - SST    Light Blue Top       Medications Given in the Emergency Department:  Medications - No data to display       ED Course:    The patient was initially evaluated in the triage area where orders were placed. The patient was later dispositioned by Remigio Jenkins PA-C.      The patient was advised to stay for completion of workup which includes but is not limited to communication of labs and radiological results, reassessment and plan. The patient was advised that leaving prior to disposition by a " provider could result in critical findings that are not communicated to the patient.          Labs:    Lab Results (last 24 hours)       ** No results found for the last 24 hours. **             Imaging:    No Radiology Exams Resulted Within Past 24 Hours      Differential Diagnosis and Discussion:      Abdominal Pain: Based on the patient's signs and symptoms, I considered abdominal aortic aneurysm, small bowel obstruction, pancreatitis, acute cholecystitis, acute appendecitis, peptic ulcer disease, gastritis, colitis, endocrine disorders, irritable bowel syndrome and other differential diagnosis an etiology of the patient's abdominal pain.        MDM               Patient Care Considerations:          Consultants/Shared Management Plan:        Social Determinants of Health:          Disposition and Care Coordination:      Eloped: This patient has left the emergency department or waiting room with no communication to myself, nursing or administrative staff. There was no opportunity to discuss the patient's decision to leave, provide medical advice or discuss alternatives to. The staff has made efforts to locate patient without success.        Final diagnoses:   Eloped from emergency department        ED Disposition       ED Disposition   Eloped    Condition   --    Comment   Patient eloped from waiting room - states that she feels better and will come back if pain returns. Patient ambulatory with steady gait.                This medical record created using voice recognition software.             Remigio Jenkins PA-C  11/29/23 8010

## 2024-03-19 ENCOUNTER — CLINICAL SUPPORT (OUTPATIENT)
Dept: UROLOGY | Facility: CLINIC | Age: 54
End: 2024-03-19
Payer: COMMERCIAL

## 2024-03-19 ENCOUNTER — TELEPHONE (OUTPATIENT)
Dept: UROLOGY | Facility: CLINIC | Age: 54
End: 2024-03-19
Payer: COMMERCIAL

## 2024-03-19 ENCOUNTER — HOSPITAL ENCOUNTER (OUTPATIENT)
Dept: GENERAL RADIOLOGY | Facility: HOSPITAL | Age: 54
Discharge: HOME | End: 2024-03-19
Admitting: NURSE PRACTITIONER
Payer: COMMERCIAL

## 2024-03-19 DIAGNOSIS — N20.0 NEPHROLITHIASIS: Primary | ICD-10-CM

## 2024-03-19 DIAGNOSIS — N20.0 NEPHROLITHIASIS: ICD-10-CM

## 2024-03-19 DIAGNOSIS — R30.0 DYSURIA: ICD-10-CM

## 2024-03-19 DIAGNOSIS — R30.0 DYSURIA: Primary | ICD-10-CM

## 2024-03-19 LAB
BILIRUB UR QL STRIP: NEGATIVE
CLARITY UR: CLEAR
COLOR UR: ABNORMAL
GLUCOSE UR STRIP-MCNC: NEGATIVE MG/DL
HGB UR QL STRIP.AUTO: NEGATIVE
KETONES UR QL STRIP: NEGATIVE
LEUKOCYTE ESTERASE UR QL STRIP.AUTO: ABNORMAL
NITRITE UR QL STRIP: POSITIVE
PH UR STRIP.AUTO: 6 [PH] (ref 5–8)
PROT UR QL STRIP: NEGATIVE
SP GR UR STRIP: 1.01 (ref 1–1.03)
UROBILINOGEN UR QL STRIP: ABNORMAL

## 2024-03-19 PROCEDURE — 74018 RADEX ABDOMEN 1 VIEW: CPT

## 2024-03-19 PROCEDURE — 81001 URINALYSIS AUTO W/SCOPE: CPT | Performed by: NURSE PRACTITIONER

## 2024-03-19 NOTE — TELEPHONE ENCOUNTER
Spoke to pt to go over results.  Pt is agreeable to have urinalysis and DNA culture done.  Pt will come in today to have completed.  Pt is of understanding.

## 2024-03-19 NOTE — PROGRESS NOTES
Let patient know her x-ray does not show any kidney stone.  If she still having blood in her urine I would like to order a urinalysis and DNA-based culture.  Her urine culture from urgent care was negative and they only did a urine dipstick I cannot determine if she had blood in her urine or if this was a chemical false positive.

## 2024-03-19 NOTE — TELEPHONE ENCOUNTER
CALLED PT AND TOLD PT THAT RO ORDERED KUB XRAY AND ONCE SHE HAS THAT DONE AND RO GETS RESULTS THEY WILL CALL TO GIVE RESULTS

## 2024-03-19 NOTE — TELEPHONE ENCOUNTER
PT WITH HISTORY OF KIDNEY STONES, CALLED TO REPORT THAT OVER THE WEEKEND SHE HAD QUITE A BIT OF BLOOD IN HER URINE AND SHE IS HAVING PAIN IN HER RIGHT FLANK AND DOWN INTO HER PELVIC AREA. ASKING IF SHE SHOULD BE SEEN. SHE DID GO TO [facility] AND THEY ADVISED HER TO CALL US.

## 2024-03-19 NOTE — TELEPHONE ENCOUNTER
----- Message from MERLE Augustine sent at 3/19/2024  2:24 PM EDT -----  Let patient know her x-ray does not show any kidney stone.  If she still having blood in her urine I would like to order a urinalysis and DNA-based culture.  Her urine culture from urgent care was negative and they only did a urine dipstick I cannot   determine if she had blood in her urine or if this was a chemical false positive.

## 2024-03-20 ENCOUNTER — TELEPHONE (OUTPATIENT)
Dept: UROLOGY | Facility: CLINIC | Age: 54
End: 2024-03-20
Payer: COMMERCIAL

## 2024-03-20 LAB
BACTERIA UR QL AUTO: NORMAL /HPF
HYALINE CASTS UR QL AUTO: NORMAL /LPF
RBC # UR STRIP: NORMAL /HPF
REF LAB TEST METHOD: NORMAL
SQUAMOUS #/AREA URNS HPF: NORMAL /HPF
WBC # UR STRIP: NORMAL /HPF

## 2024-03-20 NOTE — TELEPHONE ENCOUNTER
Poke to pt regarding results. I informed pt that we are still waiting on results from the send out culture.  We will notify her once results have been received.  Pt is of understanding.

## 2024-03-20 NOTE — TELEPHONE ENCOUNTER
----- Message from MERLE Augustine sent at 3/20/2024  8:03 AM EDT -----  Regarding: FW:    Please inform patient her urine does not show any blood at this time. It does not show any specific signs of infection and if she has been taking pyridium that may be the reason for her and I nitrite positive urine on dipstick.  ----- Message -----  From: Lab, Background User  Sent: 3/19/2024  10:32 PM EDT  To: MERLE Augustine

## 2024-04-30 ENCOUNTER — HOSPITAL ENCOUNTER (OUTPATIENT)
Dept: GENERAL RADIOLOGY | Facility: HOSPITAL | Age: 54
Discharge: HOME | End: 2024-04-30
Payer: COMMERCIAL

## 2024-04-30 ENCOUNTER — TRANSCRIBE ORDERS (OUTPATIENT)
Dept: GENERAL RADIOLOGY | Facility: HOSPITAL | Age: 54
End: 2024-04-30
Payer: COMMERCIAL

## 2024-04-30 DIAGNOSIS — M53.3 COCCYX PAIN: ICD-10-CM

## 2024-04-30 DIAGNOSIS — M25.531 RIGHT WRIST PAIN: ICD-10-CM

## 2024-04-30 DIAGNOSIS — M25.531 RIGHT WRIST PAIN: Primary | ICD-10-CM

## 2024-04-30 PROCEDURE — 73110 X-RAY EXAM OF WRIST: CPT

## 2024-04-30 PROCEDURE — 72220 X-RAY EXAM SACRUM TAILBONE: CPT

## (undated) DEVICE — GLV SURG SENSICARE SLT PF LF 8 STRL

## (undated) DEVICE — Device

## (undated) DEVICE — SYS IRR PUMP SGL ACTN VAC SYR 10CC

## (undated) DEVICE — TOWEL,OR,DSP,ST,BLUE,STD,4/PK,20PK/CS: Brand: MEDLINE

## (undated) DEVICE — Y-TYPE TUR/BLADDER IRRIGATION SET, REGULATING CLAMP

## (undated) DEVICE — CYSTO PACK: Brand: MEDLINE INDUSTRIES, INC.

## (undated) DEVICE — BASIC SINGLE BASIN-LF: Brand: MEDLINE INDUSTRIES, INC.

## (undated) DEVICE — SOL IRR NACL 0.9PCT 3000ML

## (undated) DEVICE — GW SUREGLIDE FLXTIP ANG/TP .038 3X150CM EA/5

## (undated) DEVICE — NITINOL STONE RETRIEVAL BASKET: Brand: ESCAPE

## (undated) DEVICE — FIBR LASR HOLMIUM COMPAT 272MH DISP

## (undated) DEVICE — SKIN PREP TRAY W/CHG: Brand: MEDLINE INDUSTRIES, INC.